# Patient Record
Sex: MALE | Race: BLACK OR AFRICAN AMERICAN | NOT HISPANIC OR LATINO | Employment: OTHER | URBAN - METROPOLITAN AREA
[De-identification: names, ages, dates, MRNs, and addresses within clinical notes are randomized per-mention and may not be internally consistent; named-entity substitution may affect disease eponyms.]

---

## 2023-12-04 ENCOUNTER — APPOINTMENT (EMERGENCY)
Dept: VASCULAR ULTRASOUND | Facility: HOSPITAL | Age: 68
End: 2023-12-04
Payer: COMMERCIAL

## 2023-12-04 ENCOUNTER — HOSPITAL ENCOUNTER (EMERGENCY)
Facility: HOSPITAL | Age: 68
Discharge: HOME | End: 2023-12-04
Attending: EMERGENCY MEDICINE
Payer: COMMERCIAL

## 2023-12-04 ENCOUNTER — APPOINTMENT (EMERGENCY)
Dept: RADIOLOGY | Facility: HOSPITAL | Age: 68
End: 2023-12-04
Payer: COMMERCIAL

## 2023-12-04 VITALS
TEMPERATURE: 97.8 F | DIASTOLIC BLOOD PRESSURE: 96 MMHG | OXYGEN SATURATION: 98 % | SYSTOLIC BLOOD PRESSURE: 156 MMHG | HEIGHT: 72 IN | RESPIRATION RATE: 16 BRPM | BODY MASS INDEX: 24.38 KG/M2 | WEIGHT: 180 LBS | HEART RATE: 74 BPM

## 2023-12-04 DIAGNOSIS — M25.562 LEFT KNEE PAIN: Primary | ICD-10-CM

## 2023-12-04 PROCEDURE — 99283 EMERGENCY DEPT VISIT LOW MDM: CPT

## 2023-12-04 PROCEDURE — 93971 EXTREMITY STUDY: CPT

## 2023-12-04 PROCEDURE — 93971 EXTREMITY STUDY: CPT | Performed by: SURGERY

## 2023-12-04 PROCEDURE — 73564 X-RAY EXAM KNEE 4 OR MORE: CPT

## 2023-12-04 NOTE — DISCHARGE INSTRUCTIONS
Follow up with PCP  Follow up with orthopedics  Warm compress, cold compress, supportive care  Return to the ED with new or worsening symptoms including but not limited to worsening pain, swelling, redness

## 2023-12-04 NOTE — ED PROVIDER NOTES
History  Chief Complaint   Patient presents with    Knee Pain     Atraumatic L knee pain x 10 days; locks up when sitting too long as per pt; +swelling as per pt      Patient is a 42-year-old male with no significant past medical history presenting to the emergency department for evaluation of posterior left knee pain. Patient states for the past 10 days he has been having pain and tightness behind the knee. States when he sits and goes to stand up he feels as though his knee locks and gets stuck and he has to stretch it out. Patient denies any redness to the area. Patient states yesterday after sitting for long period of time he thought he noticed swelling to the medial aspect of his left knee. Not taking Tylenol or Motrin for the pain, states it is not that severe. Patient denies any injury or fall. Denies fevers, chills, rash, headache, weakness, dizziness, visual changes, abdominal pain, nausea, vomiting, diarrhea, constipation, chest pain, shortness of breath or difficulty breathing. Does not offer any other concerns or complaints. None       No past medical history on file. No past surgical history on file. No family history on file. I have reviewed and agree with the history as documented. No existing history information found. No existing history information found. Review of Systems   Constitutional:  Negative for chills and fever. HENT:  Negative for ear pain and sore throat. Eyes:  Negative for pain and visual disturbance. Respiratory:  Negative for cough and shortness of breath. Cardiovascular:  Negative for chest pain and palpitations. Gastrointestinal:  Negative for abdominal pain and vomiting. Genitourinary:  Negative for dysuria and hematuria. Musculoskeletal:  Negative for arthralgias and back pain. Left posterior knee pain   Skin:  Negative for color change and rash. Neurological:  Negative for seizures and syncope.    All other systems reviewed and are negative. Physical Exam  Physical Exam  Vitals and nursing note reviewed. Constitutional:       General: He is not in acute distress. Appearance: Normal appearance. He is well-developed. He is not toxic-appearing or diaphoretic. HENT:      Head: Normocephalic and atraumatic. Right Ear: External ear normal.      Left Ear: External ear normal.      Nose: Nose normal.      Mouth/Throat:      Mouth: Mucous membranes are moist.   Eyes:      General: No scleral icterus. Right eye: No discharge. Left eye: No discharge. Conjunctiva/sclera: Conjunctivae normal.   Cardiovascular:      Rate and Rhythm: Normal rate and regular rhythm. Heart sounds: No murmur heard. Pulmonary:      Effort: Pulmonary effort is normal. No respiratory distress. Breath sounds: Normal breath sounds. Abdominal:      Palpations: Abdomen is soft. Tenderness: There is no abdominal tenderness. Musculoskeletal:         General: No swelling, deformity or signs of injury. Normal range of motion. Cervical back: Normal range of motion and neck supple. No rigidity. Legs:       Comments: No redness or swelling around the left knee or in the left calf. Patient has full range of motion. Tenderness to the posterior left knee. Skin:     General: Skin is warm and dry. Capillary Refill: Capillary refill takes less than 2 seconds. Coloration: Skin is not jaundiced. Findings: No erythema or rash. Neurological:      General: No focal deficit present. Mental Status: He is alert and oriented to person, place, and time. Mental status is at baseline. Cranial Nerves: No cranial nerve deficit. Gait: Gait normal.   Psychiatric:         Mood and Affect: Mood normal.         Behavior: Behavior normal.         Thought Content:  Thought content normal.         Judgment: Judgment normal.         Vital Signs  ED Triage Vitals [12/04/23 0808]   Temperature Pulse Respirations Blood Pressure SpO2   97.8 °F (36.6 °C) 74 16 156/96 98 %      Temp src Heart Rate Source Patient Position - Orthostatic VS BP Location FiO2 (%)   -- -- -- -- --      Pain Score       --           Vitals:    12/04/23 0808   BP: 156/96   Pulse: 74         Visual Acuity      ED Medications  Medications - No data to display    Diagnostic Studies  Results Reviewed       None                   VAS lower limb venous duplex study, unilateral/limited    (Results Pending)   XR knee 4+ views left injury    (Results Pending)              Procedures  Procedures         ED Course             SBIRT 20yo+      Flowsheet Row Most Recent Value   Initial Alcohol Screen: US AUDIT-C     1. How often do you have a drink containing alcohol? 0 Filed at: 12/04/2023 0807   2. How many drinks containing alcohol do you have on a typical day you are drinking? 0 Filed at: 12/04/2023 0807   3a. Male UNDER 65: How often do you have five or more drinks on one occasion? 0 Filed at: 12/04/2023 0807   3b. FEMALE Any Age, or MALE 65+: How often do you have 4 or more drinks on one occassion? 0 Filed at: 12/04/2023 6636   Audit-C Score 0 Filed at: 12/04/2023 8401   JEAN: How many times in the past year have you. .. Used an illegal drug or used a prescription medication for non-medical reasons? Never Filed at: 12/04/2023 2784                      Medical Decision Making    This is a 54-year-old male present to the emergency department evaluation of left knee pain. Patient states having pain to the left knee for the past 10 days. Patient denies any injury or fall. Patient states he feels the knee locks when sitting for too long. Patient reports noticing swelling to the medial aspect of his knee after sitting for long period of time last night. Patient is in no acute distress with stable vital signs on initial examination.     Differential diagnosis to include but is not limited to: bakers cyst, effusion, DVT    Initial ED Plan: venous duplex    ED results: fluid collection at the anteriolateral aspect of the left knee  -no visible area for drainage  Negative for DVT  Xray images knee independently visualized and interpreted by me - no acute fracture or dislocation. All radiology studies independently viewed by me and interpreted by the radiologist.    Final ED assessment: Patient is stable and well appearing. Discussed radiologic studies. Discussed follow up with PCP and orthopedics. Strict return precautions were discussed including but not limited to worsening pain, swelling, redness. Patient verbalized understanding and is agreeable with the plan for discharge. Amount and/or Complexity of Data Reviewed  Radiology: ordered. Disposition  Final diagnoses:   Left knee pain     Time reflects when diagnosis was documented in both MDM as applicable and the Disposition within this note       Time User Action Codes Description Comment    12/4/2023  9:34 AM Morenita Morales [M25.562] Left knee pain           ED Disposition       ED Disposition   Discharge    Condition   Stable    Date/Time   Mon Dec 4, 2023 [address] discharge to home/self care.                    Follow-up Information       Follow up With Specialties Details Why Contact Info Additional Information    your PCP  Call in 3 days For follow up      [facility] Emergency Department Emergency Medicine Go to  If symptoms worsen [address] [facility] [id number] [address] [facility] Emergency Department, [place], [place], [address] [facility] Call in 3 days For follow up [facility]  [address] [address]  [facility] [place], [address] [place] [address], [address]            Patient's Medications    No medications on file       No discharge procedures on file.     PDMP Review       None            ED Provider  Electronically Signed by             Amee Mccartney PA-C  12/04/23 4111

## 2023-12-14 ENCOUNTER — OFFICE VISIT (OUTPATIENT)
Dept: OBGYN CLINIC | Facility: CLINIC | Age: 68
End: 2023-12-14
Payer: COMMERCIAL

## 2023-12-14 ENCOUNTER — APPOINTMENT (OUTPATIENT)
Dept: RADIOLOGY | Facility: CLINIC | Age: 68
End: 2023-12-14
Payer: COMMERCIAL

## 2023-12-14 VITALS
DIASTOLIC BLOOD PRESSURE: 90 MMHG | HEART RATE: 91 BPM | BODY MASS INDEX: 25.47 KG/M2 | HEIGHT: 72 IN | SYSTOLIC BLOOD PRESSURE: 152 MMHG | WEIGHT: 188 LBS

## 2023-12-14 DIAGNOSIS — M25.462 EFFUSION OF LEFT KNEE: ICD-10-CM

## 2023-12-14 DIAGNOSIS — M25.562 LEFT KNEE PAIN, UNSPECIFIED CHRONICITY: ICD-10-CM

## 2023-12-14 DIAGNOSIS — M17.12 PRIMARY OSTEOARTHRITIS OF LEFT KNEE: ICD-10-CM

## 2023-12-14 DIAGNOSIS — M25.562 ACUTE PAIN OF LEFT KNEE: Primary | ICD-10-CM

## 2023-12-14 PROCEDURE — 73564 X-RAY EXAM KNEE 4 OR MORE: CPT

## 2023-12-14 PROCEDURE — 20610 DRAIN/INJ JOINT/BURSA W/O US: CPT | Performed by: ORTHOPAEDIC SURGERY

## 2023-12-14 PROCEDURE — 99204 OFFICE O/P NEW MOD 45 MIN: CPT | Performed by: ORTHOPAEDIC SURGERY

## 2023-12-14 RX ORDER — LIDOCAINE HYDROCHLORIDE 10 MG/ML
2 INJECTION, SOLUTION INFILTRATION; PERINEURAL
Status: COMPLETED | OUTPATIENT
Start: 2023-12-14 | End: 2023-12-14

## 2023-12-14 RX ORDER — BUPIVACAINE HYDROCHLORIDE 2.5 MG/ML
2 INJECTION, SOLUTION INFILTRATION; PERINEURAL
Status: COMPLETED | OUTPATIENT
Start: 2023-12-14 | End: 2023-12-14

## 2023-12-14 RX ORDER — TADALAFIL 20 MG/1
20 TABLET ORAL DAILY PRN
COMMUNITY
Start: 2023-03-07 | End: 2024-03-06

## 2023-12-14 RX ORDER — AMLODIPINE BESYLATE 5 MG/1
5 TABLET ORAL DAILY
COMMUNITY
Start: 2023-04-24 | End: 2024-04-23

## 2023-12-14 RX ORDER — METHYLPREDNISOLONE ACETATE 40 MG/ML
2 INJECTION, SUSPENSION INTRA-ARTICULAR; INTRALESIONAL; INTRAMUSCULAR; SOFT TISSUE
Status: COMPLETED | OUTPATIENT
Start: 2023-12-14 | End: 2023-12-14

## 2023-12-14 RX ADMIN — LIDOCAINE HYDROCHLORIDE 2 ML: 10 INJECTION, SOLUTION INFILTRATION; PERINEURAL at 08:00

## 2023-12-14 RX ADMIN — METHYLPREDNISOLONE ACETATE 2 ML: 40 INJECTION, SUSPENSION INTRA-ARTICULAR; INTRALESIONAL; INTRAMUSCULAR; SOFT TISSUE at 08:00

## 2023-12-14 RX ADMIN — BUPIVACAINE HYDROCHLORIDE 2 ML: 2.5 INJECTION, SOLUTION INFILTRATION; PERINEURAL at 08:00

## 2023-12-14 NOTE — PROGRESS NOTES
Patient Name:  Calli Medina  MRN:  [id number]    [id number] [facility]     1. Acute pain of left knee  -     XR knee 4+ vw left injury; Future; Expected date: 12/14/2023  -     Large joint arthrocentesis: L knee    2. Primary osteoarthritis of left knee  -     Large joint arthrocentesis: L knee    3. Effusion of left knee  -     Large joint arthrocentesis: L knee        Mild left knee osteoarthritis with acute pain  X-rays reviewed in office today with patient. In depth conversation had with patient regarding nonoperative treatment of Left knee pain including OTC topical and oral analgesics, outpatient PT to maintain ROM and improve strength, CSI vs visco injections. Also briefly discussed moving forward with MRI secondary to patient's relatively well maintained joint spaces on x-ray. Briefly discussed symptoms or meniscus tears and treatment. At this time, patient would like to hold off an consider CSI for pain relief. Tolerated procedure well. Advised patient CSI may be provided every 3 months for pain relief. If symptoms of locking, swelling and pain persist, will move forward with MRI of the Left knee to evaluate internal derangement and rule out meniscus tear. Can administer OTC medication as needed and as prescribed on the bottle. Follow up in 8 weeks for reevaluation, consideration of further imaging pending symptoms     Chief Complaint     Left knee pain    History of the Present Illness     Calli Medina is a 76 y.o. male with Left knee pain for the past 2 weeks without inciting event. Patient was seen in the ED 12/4/23 secondary to pain, swelling and locking of the knee. He locates most of his pain to the back of the knee, worse with increased activity. He will occasionally get a sharp pain in the front of the knee. He has not been administering any medication for pain relief. 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 Denies history of gout. Review of Systems     Review of Systems   Constitutional:  Negative for chills and fever. HENT:  Negative for ear pain and sore throat. Eyes:  Negative for pain and visual disturbance. Respiratory:  Negative for cough and shortness of breath. Cardiovascular:  Negative for chest pain and palpitations. Gastrointestinal:  Negative for abdominal pain and vomiting. Genitourinary:  Negative for dysuria and hematuria. Musculoskeletal:  Negative for arthralgias and back pain. Skin:  Negative for color change and rash. Neurological:  Negative for seizures and syncope. All other systems reviewed and are negative. Physical Exam     /90   Pulse 91   Ht 6' (1.829 m)   Wt 85.3 kg (188 lb)   BMI 25.50 kg/m²     Left Knee  Range of motion from 0 to 125 degrees with pain at terminal flexion. There is no crepitus with range of motion. There is trace effusion. There is tenderness over the posteromedial joint line. There is 4+/5 quadriceps strength and preserved tone. The patient is able to perform a straight leg raise. negative patellar grind test.  Anterior drawer tests is negative  negative Lachman Test.   Posterior drawer test is   negative   Varus stress testing reveals no pain or instability at 0 and 30 degrees   Valgus stress testing reveals no pain or instability at 0 and 30 degrees  Dimitri's testing is negative   The patient is neurovascular intact distally. Eyes:  Anicteric sclerae. Neck:  Supple. Lungs:  Normal respiratory effort. Cardiovascular:  Capillary refill is less than 2 seconds. Skin:  Intact without erythema. Neurologic:  Sensation grossly intact to light touch. Psychiatric:  Mood and affect are appropriate. Data Review     I have personally reviewed pertinent films in PACS, and my interpretation follows:    X-rays taken 12/14/23 of Left knee independently reviewed and demonstrate no acute fracture or dislocation. Mild medial compartment degenerative changes, but relatively well maintained joint spaces. History reviewed. No pertinent past medical history. History reviewed. No pertinent surgical history. No Known Allergies    Current Outpatient Medications on File Prior to Visit   Medication Sig Dispense Refill    amLODIPine (NORVASC) 5 mg tablet Take 5 mg by mouth daily      Cholecalciferol 50 MCG (2000 UT) CAPS Take 1 capsule by mouth      tadalafil (CIALIS) 20 MG tablet Take 20 mg by mouth daily as needed       No current facility-administered medications on file prior to visit. Social History     Tobacco Use    Smoking status: Unknown   Vaping Use    Vaping status: Never Used   Substance Use Topics    Alcohol use: Never    Drug use: Never       History reviewed. No pertinent family history.           Procedures Performed     Large joint arthrocentesis: L knee  Universal Protocol:  Risks and benefits: risks, benefits and alternatives were discussed  Consent given by: patient  Patient identity confirmed: verbally with patient  Procedure Details  Location: knee - L knee  Needle size: 22 G  Approach: lateral  Medications administered: 2 mL bupivacaine 0.25 %; 2 mL lidocaine 1 %; 2 mL methylPREDNISolone acetate 40 mg/mL    Aspirate amount: 15 mL  Aspirate: clear and yellow  Patient tolerance: patient tolerated the procedure well with no immediate complications  Dressing:  Sterile dressing applied

## 2024-03-26 ENCOUNTER — OFFICE VISIT (OUTPATIENT)
Dept: OBGYN CLINIC | Facility: CLINIC | Age: 69
End: 2024-03-26
Payer: COMMERCIAL

## 2024-03-26 VITALS
SYSTOLIC BLOOD PRESSURE: 131 MMHG | HEART RATE: 77 BPM | WEIGHT: 188 LBS | HEIGHT: 72 IN | BODY MASS INDEX: 25.47 KG/M2 | DIASTOLIC BLOOD PRESSURE: 76 MMHG

## 2024-03-26 DIAGNOSIS — M25.561 ACUTE PAIN OF RIGHT KNEE: ICD-10-CM

## 2024-03-26 DIAGNOSIS — M25.462 EFFUSION OF LEFT KNEE: ICD-10-CM

## 2024-03-26 DIAGNOSIS — M23.92 ACUTE INTERNAL DERANGEMENT OF LEFT KNEE: Primary | ICD-10-CM

## 2024-03-26 PROCEDURE — 99214 OFFICE O/P EST MOD 30 MIN: CPT | Performed by: ORTHOPAEDIC SURGERY

## 2024-03-26 RX ORDER — TAMSULOSIN HYDROCHLORIDE 0.4 MG/1
0.4 CAPSULE ORAL
COMMUNITY
Start: 2024-03-15 | End: 2025-03-15

## 2024-03-26 NOTE — PROGRESS NOTES
Patient Name:  Daniel Llanos  MRN:  [id number]    Assessment & Plan     1. Acute internal derangement of left knee  -     MRI knee left  wo contrast; Future; Expected date: 03/26/2024    2. Acute pain of right knee    3. Effusion of left knee      Left knee internal derangement, concern for acute meniscus tear   In light of patient's persistent knee pain and swelling despite aspiration and CSI, will move forward with MRI of Left knee to evaluate meniscus and cartilage surfaces.   In the meantime, advised patient to continue with gentle range of motion exercises to prevent stiffness. He may administer NSAIDs and apply ice as needed for swelling. Can also take Tylenol OTC.   Patient will follow up in office once MRI resulted and discussion of nonoperative vs surgical management of Left knee    History of the Present Illness   Daniel Llanos is a 68 y.o. male with Left knee swelling and pain s/p aspiration and CSI on 12/14/2023. Today, patient reports he had 100% improvement of symptoms with most recent aspiration and CSI. He started to notice increased swelling since last Thursday. He does have pain with deep knee flexion. 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         Review of Systems     Review of Systems   Constitutional:  Negative for chills and fever.   HENT:  Negative for ear pain and sore throat.    Eyes:  Negative for pain and visual disturbance.   Respiratory:  Negative for cough and shortness of breath.    Cardiovascular:  Negative for chest pain and palpitations.   Gastrointestinal:  Negative for abdominal pain and vomiting.   Genitourinary:  Negative for dysuria and hematuria.   Musculoskeletal:  Negative for arthralgias and back pain.   Skin:  Negative for color change and rash.   Neurological:  Negative for seizures and syncope.   All other systems reviewed and are negative.      Physical Exam     /76   Pulse 77   Ht 6' (1.829 m)   Wt 85.3 kg (188 lb)   BMI  25.50 kg/m²     Left Knee  Range of motion from 0 to 125 degrees with pain at terminal flexion.    There is small effusion.    There is tenderness over the medial joint line.    The patient is able to perform a straight leg raise with 5/5 quad strength.    Varus stress testing reveals no pain or instability at 0 and 30 degrees   Valgus stress testing reveals no pain or instability at 0 and 30 degrees  Positive Dimitri test, medially  The patient is neurovascular intact distally.      Data Review     I have personally reviewed pertinent films in PACS, and my interpretation follows.    X-rays taken 12/14/23 of Left knee independently reviewed and demonstrate no acute fracture or dislocation. Mild medial compartment degenerative changes, but relatively well maintained joint spaces.     Social History     Tobacco Use    Smoking status: Unknown   Vaping Use    Vaping status: Never Used   Substance Use Topics    Alcohol use: Never    Drug use: Never           Procedures  None     Bryson Alvarez,

## 2024-04-04 ENCOUNTER — HOSPITAL ENCOUNTER (OUTPATIENT)
Dept: MRI IMAGING | Facility: CLINIC | Age: 69
Discharge: HOME | End: 2024-04-04
Payer: COMMERCIAL

## 2024-04-04 DIAGNOSIS — M23.92 ACUTE INTERNAL DERANGEMENT OF LEFT KNEE: ICD-10-CM

## 2024-04-04 PROCEDURE — 73721 MRI JNT OF LWR EXTRE W/O DYE: CPT

## 2024-04-12 ENCOUNTER — OFFICE VISIT (OUTPATIENT)
Dept: OBGYN CLINIC | Facility: CLINIC | Age: 69
End: 2024-04-12
Payer: COMMERCIAL

## 2024-04-12 VITALS
HEART RATE: 73 BPM | DIASTOLIC BLOOD PRESSURE: 91 MMHG | WEIGHT: 187.4 LBS | HEIGHT: 72 IN | SYSTOLIC BLOOD PRESSURE: 155 MMHG | BODY MASS INDEX: 25.38 KG/M2

## 2024-04-12 DIAGNOSIS — M17.12 PRIMARY OSTEOARTHRITIS OF LEFT KNEE: Primary | ICD-10-CM

## 2024-04-12 DIAGNOSIS — S83.232D COMPLEX TEAR OF MEDIAL MENISCUS OF LEFT KNEE AS CURRENT INJURY, SUBSEQUENT ENCOUNTER: ICD-10-CM

## 2024-04-12 PROCEDURE — 99213 OFFICE O/P EST LOW 20 MIN: CPT | Performed by: ORTHOPAEDIC SURGERY

## 2024-04-12 NOTE — PROGRESS NOTES
Patient Name:  Daniel Llanos  MRN:  [id number]    Assessment & Plan     1. Primary osteoarthritis of left knee    2. Complex tear of medial meniscus of left knee as current injury, subsequent encounter      Left knee medial meniscus tear and patellofemoral ostoearthritis  MRI reviewed in office with patient  Overall, patient feeling improvement since initial visit without symptoms of locking or pain.   Briefly discussed nonoperative vs surgical management of Left knee.   Nonoperative management by means of repeat CSI vs visco injections, outpatient PT, OTC topical and oral analgesics.   If patient's symptoms of pain and swelling were to worsen or if he were to experience locking symptoms, would recommend moving forward with surgical intervention by means of knee arthroscopic with partial medial menisectomy. Patient verbalized understanding, but would like to continue with nonoperative management at this time.  Follow up as needed     History of the Present Illness   Daniel Llanos is a 68 y.o. male with Left knee internal derangement. Today, patient reports he is feeling much better compared to his first appointment in office. He admits to using a copper fit knee sleeve with improvement and decreased swelling. He denies pain or taking any medications for pain. He admits to feeling some discomfort in the front of his knee occasionally. He denies activities that make the pain worse. Overall, he is feeling better since last appointment.         Review of Systems     Review of Systems   Constitutional:  Negative for chills and fever.   HENT:  Negative for ear pain and sore throat.    Eyes:  Negative for pain and visual disturbance.   Respiratory:  Negative for cough and shortness of breath.    Cardiovascular:  Negative for chest pain and palpitations.   Gastrointestinal:  Negative for abdominal pain and vomiting.   Genitourinary:  Negative for dysuria and hematuria.   Musculoskeletal:  Negative for arthralgias and back  pain.   Skin:  Negative for color change and rash.   Neurological:  Negative for seizures and syncope.   All other systems reviewed and are negative.      Physical Exam     /91 Comment: pt did not take BP medications this morning  Pulse 73   Ht 6' (1.829 m)   Wt 85 kg (187 lb 6.4 oz)   BMI 25.42 kg/m²     Left Knee  Range of motion from 0 to 125 degrees without pain.    There is no effusion.    There is no tenderness over the knee.    The patient is able to perform a straight leg raise with 4+/5 quad strength.    Varus stress testing reveals no pain or instability at 0 and 30 degrees   Valgus stress testing reveals no pain or instability at 0 and 30 degrees  Negative Dimitri test  The patient is neurovascular intact distally.      Data Review     I have personally reviewed pertinent films in PACS, and my interpretation follows.    X-rays taken 12/14/23 of Left knee independently reviewed and demonstrate no acute fracture or dislocation. Mild medial compartment degenerative changes, but relatively well maintained joint spaces.      MRI performed 04/04/2024 of Left knee demonstrates complex tear of posterior horn of medial meniscus with small fragment displaced into inter condylar notch. Full thickness cartilage fissuring at median ridge and partial loss at medial facet.     Social History     Tobacco Use    Smoking status: Unknown   Vaping Use    Vaping status: Never Used   Substance Use Topics    Alcohol use: Never    Drug use: Never           Procedures  None     Liana Selby PA-C

## 2024-04-29 ENCOUNTER — OFFICE VISIT (OUTPATIENT)
Dept: OBGYN CLINIC | Facility: CLINIC | Age: 69
End: 2024-04-29
Payer: COMMERCIAL

## 2024-04-29 VITALS
BODY MASS INDEX: 25.98 KG/M2 | HEART RATE: 78 BPM | SYSTOLIC BLOOD PRESSURE: 137 MMHG | WEIGHT: 191.8 LBS | HEIGHT: 72 IN | DIASTOLIC BLOOD PRESSURE: 83 MMHG

## 2024-04-29 DIAGNOSIS — M17.12 PRIMARY OSTEOARTHRITIS OF LEFT KNEE: Primary | ICD-10-CM

## 2024-04-29 DIAGNOSIS — S83.232D COMPLEX TEAR OF MEDIAL MENISCUS OF LEFT KNEE AS CURRENT INJURY, SUBSEQUENT ENCOUNTER: ICD-10-CM

## 2024-04-29 DIAGNOSIS — M25.562 LEFT KNEE PAIN, UNSPECIFIED CHRONICITY: ICD-10-CM

## 2024-04-29 PROCEDURE — 20610 DRAIN/INJ JOINT/BURSA W/O US: CPT | Performed by: ORTHOPAEDIC SURGERY

## 2024-04-29 PROCEDURE — 99214 OFFICE O/P EST MOD 30 MIN: CPT | Performed by: ORTHOPAEDIC SURGERY

## 2024-04-29 RX ORDER — BUPIVACAINE HYDROCHLORIDE 2.5 MG/ML
2 INJECTION, SOLUTION INFILTRATION; PERINEURAL
Status: COMPLETED | OUTPATIENT
Start: 2024-04-29 | End: 2024-04-29

## 2024-04-29 RX ORDER — METHYLPREDNISOLONE ACETATE 40 MG/ML
2 INJECTION, SUSPENSION INTRA-ARTICULAR; INTRALESIONAL; INTRAMUSCULAR; SOFT TISSUE
Status: COMPLETED | OUTPATIENT
Start: 2024-04-29 | End: 2024-04-29

## 2024-04-29 RX ORDER — LIDOCAINE HYDROCHLORIDE 10 MG/ML
2 INJECTION, SOLUTION INFILTRATION; PERINEURAL
Status: COMPLETED | OUTPATIENT
Start: 2024-04-29 | End: 2024-04-29

## 2024-04-29 RX ADMIN — LIDOCAINE HYDROCHLORIDE 2 ML: 10 INJECTION, SOLUTION INFILTRATION; PERINEURAL at 11:15

## 2024-04-29 RX ADMIN — BUPIVACAINE HYDROCHLORIDE 2 ML: 2.5 INJECTION, SOLUTION INFILTRATION; PERINEURAL at 11:15

## 2024-04-29 RX ADMIN — METHYLPREDNISOLONE ACETATE 2 ML: 40 INJECTION, SUSPENSION INTRA-ARTICULAR; INTRALESIONAL; INTRAMUSCULAR; SOFT TISSUE at 11:15

## 2024-04-29 NOTE — PROGRESS NOTES
Patient Name:  Daniel Llanos  MRN:  [id number]    Assessment & Plan     1. Primary osteoarthritis of left knee  -     Large joint arthrocentesis: L knee    2. Complex tear of medial meniscus of left knee as current injury, subsequent encounter  -     Large joint arthrocentesis: L knee    3. Left knee pain, unspecified chronicity  -     Large joint arthrocentesis: L knee      Left knee osteoarthritis and medial meniscus tear  Discussed the patient's diagnosis and associated treatment options including conservative and surgical treatment.    Non operative management would include formal physical therapy and physician directed home exercise program, activity modification, oral analgesics, and possible injection therapy.    Surgical intervention was discussed by means of knee arthroscopic partial medial menisectomy.   Risks of surgery, including but not limited to, anesthesia complications, infection, damage to nerves and blood vessels, blood clots, postoperative stiffness, recurrent meniscal tearing, posttraumatic arthritis, residual pain, need for subsequent surgery, were discussed at length.    Educated patient about the procedure, intraoperative findings, outpatient PT, return to unrestricted activity.  Educated patient about variable results post operatively secondary to preexisting osteoarthritis. Advised patient some of his current symptoms are likely from osteoarthritis. Recommended repeat CSI today. Risks discussed and tolerated procedure well. Can repeat every 3 months as needed for pain relief vs consideration of visco injections.   Follow up in 3 months for reevaluation and discussion of continued nonoperative vs surgical management pending symptoms       History of the Present Illness   Daniel Llanos is a 69 y.o. male with Left knee osteoarthritis and degenerative medial meniscus tear. Today, patient reports he has been having some pain at the anterior medial aspect of the knee. 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 He continues to wear the knee sleeve. He admits to pain relief from previous CSI on 12/14/2023.         Review of Systems     Review of Systems   Constitutional:  Negative for chills and fever.   HENT:  Negative for ear pain and sore throat.    Eyes:  Negative for pain and visual disturbance.   Respiratory:  Negative for cough and shortness of breath.    Cardiovascular:  Negative for chest pain and palpitations.   Gastrointestinal:  Negative for abdominal pain and vomiting.   Genitourinary:  Negative for dysuria and hematuria.   Musculoskeletal:  Negative for arthralgias and back pain.   Skin:  Negative for color change and rash.   Neurological:  Negative for seizures and syncope.   All other systems reviewed and are negative.      Physical Exam     /83   Pulse 78   Ht 6' (1.829 m)   Wt 87 kg (191 lb 12.8 oz)   BMI 26.01 kg/m²     Left Knee  Range of motion from 0 to 120 degrees without pain.    There is no effusion.    There is tenderness over the posteromedial joint line.    The patient is able to perform a straight leg raise with 5/5 quad strength.    Varus stress testing reveals no pain or instability at 0 and 30 degrees   Valgus stress testing reveals no pain or instability at 0 and 30 degrees  Negative Dimitri test  Negative Thessaly test  The patient is neurovascular intact distally.      Data Review     I have personally reviewed pertinent films in PACS, and my interpretation follows.    X-rays taken 12/14/23 of Left knee independently reviewed and demonstrate no acute fracture or dislocation. Mild medial compartment degenerative changes, but relatively well maintained joint spaces.       MRI performed 04/04/2024 of Left knee demonstrates complex tear of posterior horn of medial meniscus with small fragment displaced into inter condylar notch. Full thickness cartilage fissuring at median ridge and partial loss at medial facet.     Social  History     Tobacco Use    Smoking status: Unknown   Vaping Use    Vaping status: Never Used   Substance Use Topics    Alcohol use: Never    Drug use: Never           Large joint arthrocentesis: L knee  Universal Protocol:  Risks and benefits: risks, benefits and alternatives were discussed  Consent given by: patient  Patient identity confirmed: verbally with patient  Procedure Details  Location: knee - L knee  Needle size: 22 G  Approach: lateral  Medications administered: 2 mL bupivacaine 0.25 %; 2 mL lidocaine 1 %; 2 mL methylPREDNISolone acetate 40 mg/mL    Patient tolerance: patient tolerated the procedure well with no immediate complications  Dressing:  Sterile dressing applied            Bryson Alvarez DO

## 2024-12-09 ENCOUNTER — APPOINTMENT (EMERGENCY)
Dept: RADIOLOGY | Facility: HOSPITAL | Age: 69
DRG: 202 | End: 2024-12-09
Payer: COMMERCIAL

## 2024-12-09 ENCOUNTER — HOSPITAL ENCOUNTER (INPATIENT)
Facility: HOSPITAL | Age: 69
LOS: 2 days | Discharge: HOME | DRG: 202 | End: 2024-12-11
Attending: EMERGENCY MEDICINE
Payer: COMMERCIAL

## 2024-12-09 DIAGNOSIS — J20.9 ACUTE BRONCHITIS: ICD-10-CM

## 2024-12-09 DIAGNOSIS — Z72.0 TOBACCO ABUSE: ICD-10-CM

## 2024-12-09 DIAGNOSIS — J20.8 ACUTE BRONCHITIS DUE TO OTHER SPECIFIED ORGANISMS: ICD-10-CM

## 2024-12-09 DIAGNOSIS — J96.01 ACUTE RESPIRATORY FAILURE WITH HYPOXIA (HCC): ICD-10-CM

## 2024-12-09 DIAGNOSIS — R09.02 HYPOXIA: Primary | ICD-10-CM

## 2024-12-09 PROBLEM — N40.0 BPH (BENIGN PROSTATIC HYPERPLASIA): Status: ACTIVE | Noted: 2024-12-09

## 2024-12-09 PROBLEM — R06.2 WHEEZING: Status: ACTIVE | Noted: 2024-12-09

## 2024-12-09 PROBLEM — I10 PRIMARY HYPERTENSION: Status: ACTIVE | Noted: 2024-12-09

## 2024-12-09 LAB
ALBUMIN SERPL BCG-MCNC: 3.9 G/DL (ref 3.5–5)
ALP SERPL-CCNC: 48 U/L (ref 34–104)
ALT SERPL W P-5'-P-CCNC: 9 U/L (ref 7–52)
ANION GAP SERPL CALCULATED.3IONS-SCNC: 7 MMOL/L (ref 4–13)
AST SERPL W P-5'-P-CCNC: 10 U/L (ref 13–39)
ATRIAL RATE: 79 BPM
BASOPHILS # BLD MANUAL: 0 THOUSAND/UL (ref 0–0.1)
BASOPHILS NFR MAR MANUAL: 0 % (ref 0–1)
BILIRUB SERPL-MCNC: 0.83 MG/DL (ref 0.2–1)
BUN SERPL-MCNC: 10 MG/DL (ref 5–25)
CALCIUM SERPL-MCNC: 9 MG/DL (ref 8.4–10.2)
CARDIAC TROPONIN I PNL SERPL HS: 4 NG/L (ref ?–50)
CHLORIDE SERPL-SCNC: 100 MMOL/L (ref 96–108)
CO2 SERPL-SCNC: 29 MMOL/L (ref 21–32)
CREAT SERPL-MCNC: 0.89 MG/DL (ref 0.6–1.3)
EOSINOPHIL # BLD MANUAL: 0.21 THOUSAND/UL (ref 0–0.4)
EOSINOPHIL NFR BLD MANUAL: 2 % (ref 0–6)
ERYTHROCYTE [DISTWIDTH] IN BLOOD BY AUTOMATED COUNT: 12.8 % (ref 11.6–15.1)
FLUAV AG UPPER RESP QL IA.RAPID: NEGATIVE
FLUBV AG UPPER RESP QL IA.RAPID: NEGATIVE
GFR SERPL CREATININE-BSD FRML MDRD: 87 ML/MIN/1.73SQ M
GLUCOSE SERPL-MCNC: 104 MG/DL (ref 65–140)
HCT VFR BLD AUTO: 39.2 % (ref 36.5–49.3)
HGB BLD-MCNC: 13.5 G/DL (ref 12–17)
LACTATE SERPL-SCNC: 1 MMOL/L (ref 0.5–2)
LYMPHOCYTES # BLD AUTO: 2.83 THOUSAND/UL (ref 0.6–4.47)
LYMPHOCYTES # BLD AUTO: 22 % (ref 14–44)
MCH RBC QN AUTO: 29.5 PG (ref 26.8–34.3)
MCHC RBC AUTO-ENTMCNC: 34.4 G/DL (ref 31.4–37.4)
MCV RBC AUTO: 86 FL (ref 82–98)
MONOCYTES # BLD AUTO: 1.15 THOUSAND/UL (ref 0–1.22)
MONOCYTES NFR BLD: 11 % (ref 4–12)
NEUTROPHILS # BLD MANUAL: 6.28 THOUSAND/UL (ref 1.85–7.62)
NEUTS BAND NFR BLD MANUAL: 2 % (ref 0–8)
NEUTS SEG NFR BLD AUTO: 58 % (ref 43–75)
P AXIS: 61 DEGREES
PLATELET # BLD AUTO: 195 THOUSANDS/UL (ref 149–390)
PLATELET BLD QL SMEAR: ADEQUATE
PMV BLD AUTO: 10.1 FL (ref 8.9–12.7)
POTASSIUM SERPL-SCNC: 4.2 MMOL/L (ref 3.5–5.3)
PR INTERVAL: 134 MS
PROCALCITONIN SERPL-MCNC: <0.05 NG/ML
PROT SERPL-MCNC: 6.9 G/DL (ref 6.4–8.4)
QRS AXIS: -23 DEGREES
QRSD INTERVAL: 68 MS
QT INTERVAL: 388 MS
QTC INTERVAL: 444 MS
RBC # BLD AUTO: 4.57 MILLION/UL (ref 3.88–5.62)
RBC MORPH BLD: NORMAL
S PYO DNA THROAT QL NAA+PROBE: NOT DETECTED
SARS-COV+SARS-COV-2 AG RESP QL IA.RAPID: NEGATIVE
SODIUM SERPL-SCNC: 136 MMOL/L (ref 135–147)
T WAVE AXIS: 76 DEGREES
VARIANT LYMPHS # BLD AUTO: 5 %
VENTRICULAR RATE: 79 BPM
WBC # BLD AUTO: 10.47 THOUSAND/UL (ref 4.31–10.16)

## 2024-12-09 PROCEDURE — 96360 HYDRATION IV INFUSION INIT: CPT

## 2024-12-09 PROCEDURE — 85027 COMPLETE CBC AUTOMATED: CPT | Performed by: EMERGENCY MEDICINE

## 2024-12-09 PROCEDURE — 71046 X-RAY EXAM CHEST 2 VIEWS: CPT

## 2024-12-09 PROCEDURE — 94664 DEMO&/EVAL PT USE INHALER: CPT

## 2024-12-09 PROCEDURE — 99223 1ST HOSP IP/OBS HIGH 75: CPT

## 2024-12-09 PROCEDURE — 93005 ELECTROCARDIOGRAM TRACING: CPT

## 2024-12-09 PROCEDURE — 83605 ASSAY OF LACTIC ACID: CPT | Performed by: EMERGENCY MEDICINE

## 2024-12-09 PROCEDURE — 94640 AIRWAY INHALATION TREATMENT: CPT

## 2024-12-09 PROCEDURE — 99285 EMERGENCY DEPT VISIT HI MDM: CPT | Performed by: EMERGENCY MEDICINE

## 2024-12-09 PROCEDURE — 87811 SARS-COV-2 COVID19 W/OPTIC: CPT | Performed by: EMERGENCY MEDICINE

## 2024-12-09 PROCEDURE — 87804 INFLUENZA ASSAY W/OPTIC: CPT | Performed by: EMERGENCY MEDICINE

## 2024-12-09 PROCEDURE — 99284 EMERGENCY DEPT VISIT MOD MDM: CPT

## 2024-12-09 PROCEDURE — 96361 HYDRATE IV INFUSION ADD-ON: CPT

## 2024-12-09 PROCEDURE — 94760 N-INVAS EAR/PLS OXIMETRY 1: CPT

## 2024-12-09 PROCEDURE — 85007 BL SMEAR W/DIFF WBC COUNT: CPT | Performed by: EMERGENCY MEDICINE

## 2024-12-09 PROCEDURE — 84145 PROCALCITONIN (PCT): CPT | Performed by: EMERGENCY MEDICINE

## 2024-12-09 PROCEDURE — 93010 ELECTROCARDIOGRAM REPORT: CPT | Performed by: INTERNAL MEDICINE

## 2024-12-09 PROCEDURE — 36415 COLL VENOUS BLD VENIPUNCTURE: CPT | Performed by: EMERGENCY MEDICINE

## 2024-12-09 PROCEDURE — 84484 ASSAY OF TROPONIN QUANT: CPT | Performed by: EMERGENCY MEDICINE

## 2024-12-09 PROCEDURE — 80053 COMPREHEN METABOLIC PANEL: CPT | Performed by: EMERGENCY MEDICINE

## 2024-12-09 PROCEDURE — 87040 BLOOD CULTURE FOR BACTERIA: CPT | Performed by: EMERGENCY MEDICINE

## 2024-12-09 PROCEDURE — 87651 STREP A DNA AMP PROBE: CPT | Performed by: EMERGENCY MEDICINE

## 2024-12-09 RX ORDER — ACETAMINOPHEN 325 MG/1
975 TABLET ORAL EVERY 6 HOURS PRN
Status: DISCONTINUED | OUTPATIENT
Start: 2024-12-09 | End: 2024-12-11 | Stop reason: HOSPADM

## 2024-12-09 RX ORDER — GABAPENTIN 300 MG/1
300 CAPSULE ORAL 3 TIMES DAILY
COMMUNITY

## 2024-12-09 RX ORDER — ALBUTEROL SULFATE 0.83 MG/ML
5 SOLUTION RESPIRATORY (INHALATION) ONCE
Status: COMPLETED | OUTPATIENT
Start: 2024-12-09 | End: 2024-12-09

## 2024-12-09 RX ORDER — ALBUTEROL SULFATE 90 UG/1
2 INHALANT RESPIRATORY (INHALATION) EVERY 6 HOURS PRN
Status: DISCONTINUED | OUTPATIENT
Start: 2024-12-09 | End: 2024-12-11 | Stop reason: HOSPADM

## 2024-12-09 RX ORDER — LEVALBUTEROL INHALATION SOLUTION 1.25 MG/3ML
1.25 SOLUTION RESPIRATORY (INHALATION)
Status: DISCONTINUED | OUTPATIENT
Start: 2024-12-09 | End: 2024-12-11 | Stop reason: HOSPADM

## 2024-12-09 RX ORDER — GABAPENTIN 300 MG/1
300 CAPSULE ORAL 3 TIMES DAILY
Status: DISCONTINUED | OUTPATIENT
Start: 2024-12-09 | End: 2024-12-11 | Stop reason: HOSPADM

## 2024-12-09 RX ORDER — SODIUM CHLORIDE 9 MG/ML
3 INJECTION INTRAVENOUS
Status: DISCONTINUED | OUTPATIENT
Start: 2024-12-09 | End: 2024-12-11 | Stop reason: HOSPADM

## 2024-12-09 RX ORDER — AMLODIPINE BESYLATE 10 MG/1
10 TABLET ORAL DAILY
Status: DISCONTINUED | OUTPATIENT
Start: 2024-12-09 | End: 2024-12-11 | Stop reason: HOSPADM

## 2024-12-09 RX ORDER — METHYLPREDNISOLONE SODIUM SUCCINATE 40 MG/ML
40 INJECTION, POWDER, LYOPHILIZED, FOR SOLUTION INTRAMUSCULAR; INTRAVENOUS EVERY 12 HOURS SCHEDULED
Status: DISCONTINUED | OUTPATIENT
Start: 2024-12-09 | End: 2024-12-10

## 2024-12-09 RX ORDER — NICOTINE 21 MG/24HR
14 PATCH, TRANSDERMAL 24 HOURS TRANSDERMAL ONCE
Status: COMPLETED | OUTPATIENT
Start: 2024-12-09 | End: 2024-12-10

## 2024-12-09 RX ORDER — IPRATROPIUM BROMIDE AND ALBUTEROL SULFATE 2.5; .5 MG/3ML; MG/3ML
3 SOLUTION RESPIRATORY (INHALATION)
Status: DISCONTINUED | OUTPATIENT
Start: 2024-12-09 | End: 2024-12-09

## 2024-12-09 RX ORDER — PREDNISONE 20 MG/1
60 TABLET ORAL ONCE
Status: COMPLETED | OUTPATIENT
Start: 2024-12-09 | End: 2024-12-09

## 2024-12-09 RX ORDER — AZITHROMYCIN 500 MG/1
500 TABLET, FILM COATED ORAL EVERY 24 HOURS
Status: COMPLETED | OUTPATIENT
Start: 2024-12-09 | End: 2024-12-11

## 2024-12-09 RX ORDER — ENOXAPARIN SODIUM 100 MG/ML
40 INJECTION SUBCUTANEOUS DAILY
Status: DISCONTINUED | OUTPATIENT
Start: 2024-12-09 | End: 2024-12-11 | Stop reason: HOSPADM

## 2024-12-09 RX ORDER — TAMSULOSIN HYDROCHLORIDE 0.4 MG/1
0.4 CAPSULE ORAL
Status: DISCONTINUED | OUTPATIENT
Start: 2024-12-09 | End: 2024-12-11 | Stop reason: HOSPADM

## 2024-12-09 RX ADMIN — LEVALBUTEROL HYDROCHLORIDE 1.25 MG: 1.25 SOLUTION RESPIRATORY (INHALATION) at 19:17

## 2024-12-09 RX ADMIN — IPRATROPIUM BROMIDE 0.5 MG: 0.5 SOLUTION RESPIRATORY (INHALATION) at 19:17

## 2024-12-09 RX ADMIN — ALBUTEROL SULFATE 5 MG: 2.5 SOLUTION RESPIRATORY (INHALATION) at 10:19

## 2024-12-09 RX ADMIN — IPRATROPIUM BROMIDE 0.5 MG: 0.5 SOLUTION RESPIRATORY (INHALATION) at 10:19

## 2024-12-09 RX ADMIN — TAMSULOSIN HYDROCHLORIDE 0.4 MG: 0.4 CAPSULE ORAL at 16:39

## 2024-12-09 RX ADMIN — ENOXAPARIN SODIUM 40 MG: 40 INJECTION SUBCUTANEOUS at 13:41

## 2024-12-09 RX ADMIN — AMLODIPINE BESYLATE 10 MG: 10 TABLET ORAL at 13:41

## 2024-12-09 RX ADMIN — NICOTINE 14 MG: 14 PATCH, EXTENDED RELEASE TRANSDERMAL at 17:03

## 2024-12-09 RX ADMIN — GABAPENTIN 300 MG: 300 CAPSULE ORAL at 21:21

## 2024-12-09 RX ADMIN — AZITHROMYCIN 500 MG: 500 TABLET, FILM COATED ORAL at 13:41

## 2024-12-09 RX ADMIN — IPRATROPIUM BROMIDE AND ALBUTEROL SULFATE 3 ML: 2.5; .5 SOLUTION RESPIRATORY (INHALATION) at 14:01

## 2024-12-09 RX ADMIN — PREDNISONE 60 MG: 20 TABLET ORAL at 10:19

## 2024-12-09 RX ADMIN — SODIUM CHLORIDE 1000 ML: 0.9 INJECTION, SOLUTION INTRAVENOUS at 10:24

## 2024-12-09 RX ADMIN — IPRATROPIUM BROMIDE 0.5 MG: 0.5 SOLUTION RESPIRATORY (INHALATION) at 12:39

## 2024-12-09 RX ADMIN — GABAPENTIN 300 MG: 300 CAPSULE ORAL at 16:39

## 2024-12-09 RX ADMIN — ALBUTEROL SULFATE 5 MG: 2.5 SOLUTION RESPIRATORY (INHALATION) at 12:39

## 2024-12-09 RX ADMIN — METHYLPREDNISOLONE SODIUM SUCCINATE 40 MG: 40 INJECTION, POWDER, FOR SOLUTION INTRAMUSCULAR; INTRAVENOUS at 18:36

## 2024-12-09 RX ADMIN — CEFTRIAXONE SODIUM 2000 MG: 10 INJECTION, POWDER, FOR SOLUTION INTRAVENOUS at 12:47

## 2024-12-09 NOTE — H&P
H&P - Hospitalist   Name: Daniel Llanos 69 y.o. male I MRN: [id number]  Unit/Bed#: -01 I Date of Admission: 12/9/2024   Date of Service: 12/9/2024 I Hospital Day: 0     Assessment & Plan  Wheezing  Patient on presentation due to acute wheezing cough and congestion starting  Patient reports being a current smoker.  Was never diagnosed for COPD in the past.  Received IV steroids and treatment in the ED however patient was noted to have O2 drop in 80s on room air while ambulating.  Admitted for possible COPD exacerbation vs acute bronchitis.  Chest x-ray unremarkable for pneumonia.  COVID/RSV/flu normal  Plan:  Will admit for treatment with nebulizer and steroids and monitor O2 sats.  Will start on azithromycin 500 mg for 3 days to cover for bronchitis and also for anti-inflammatory effect.  Pending procalcitonin  Patient will require PCP follow up and possible PFTs in the outpatient setting     Acute bronchitis due to other specified organisms  Patient on presentation due to cough, sore throat, congestion and wheezing.  Chest x-ray unrevealing infiltrates concerning for pneumonia.  Questionable COPD exacerbation given patient current smoker.  Started azithromycin 500 mg for 3 days,  Started IV steroids and nebulizers  Tobacco abuse  Admits to be a current smoker.  Counseled about smoking cessation    Primary hypertension  Reports taking amlodipine 10 mg daily  Will continue home regimen  BPH (benign prostatic hyperplasia)  Tamsulosin 0.4 mg  Continue home regimen      VTE Pharmacologic Prophylaxis: VTE Score: 5 High Risk (Score >/= 5) - Pharmacological DVT Prophylaxis Ordered: enoxaparin (Lovenox). Sequential Compression Devices Ordered.  Code Status: Level 1 - Full Code patient  Discussion with family: Updated  (wife) at bedside.    Anticipated Length of Stay: Patient will be admitted on an inpatient basis with an anticipated length of stay of greater than 2 midnights secondary to cough, wheezing,  hypoxia in the ED.    History of Present Illness   Chief Complaint: Cough, sore throat, wheezingsore throat, wheezing wheezing    Daniel Llanos is a 69 y.o. male with a PMH of tobacco abuse, hypertension, BPH who presents with upper respiratory infection such as cough, sore throat, chills, congestion.  Symptoms started on Thursday.  Patient reports that his son also got sick.  In the ED patient was noted to have diffuse wheezing and rhonchi.  Was given oral steroids and nebulizers.  Patient was ambulated to assess the oxygen level and was noted to drop into the 80s.  Patient was ultimately decided to be admitted for bronchitis/COPD exacerbation management and O2 monitoring.    Review of Systems   Constitutional:  Negative for chills and fever.   HENT:  Positive for sore throat. Negative for ear pain.    Eyes:  Negative for pain and visual disturbance.   Respiratory:  Positive for cough and wheezing. Negative for shortness of breath.    Cardiovascular:  Negative for chest pain and palpitations.   Gastrointestinal:  Negative for abdominal pain and vomiting.   Genitourinary:  Negative for dysuria and hematuria.   Musculoskeletal:  Negative for arthralgias and back pain.   Skin:  Negative for color change and rash.   Neurological:  Positive for light-headedness. Negative for seizures and syncope.   All other systems reviewed and are negative.      Historical Information   History reviewed. No pertinent past medical history.  History reviewed. No pertinent surgical history.  Social History     Tobacco Use    Smoking status: Unknown    Smokeless tobacco: Not on file   Vaping Use    Vaping status: Never Used   Substance and Sexual Activity    Alcohol use: Never    Drug use: Never    Sexual activity: Not on file     E-Cigarette/Vaping    E-Cigarette Use Never User      E-Cigarette/Vaping Substances    Nicotine No     THC No     CBD No     Flavoring No     Other No     Unknown No      History reviewed. No pertinent family  history.  Social History:  Marital Status: /Civil Union   Occupation:   Patient Pre-hospital Living Situation: Home  Patient Pre-hospital Level of Mobility: walks  Patient Pre-hospital Diet Restrictions: no    Meds/Allergies   I have reviewed home medications with patient personally.  Prior to Admission medications    Medication Sig Start Date End Date Taking? Authorizing Provider   amLODIPine (NORVASC) 5 mg tablet Take 10 mg by mouth daily 4/24/23 12/9/24 Yes Historical Provider, MD   gabapentin (NEURONTIN) 300 mg capsule Take 300 mg by mouth 3 (three) times a day   Yes Historical Provider, MD   tamsulosin (FLOMAX) 0.4 mg Take 0.4 mg by mouth 3/15/24 3/15/25 Yes Historical Provider, MD     No Known Allergies    Objective :  Temp:  [97.7 °F (36.5 °C)] 97.7 °F (36.5 °C)  HR:  [] 89  BP: (124-139)/(70-78) 138/78  Resp:  [18-27] 27  SpO2:  [90 %-94 %] 94 %  O2 Device: None (Room air)    Physical Exam  Vitals and nursing note reviewed.   Constitutional:       General: He is not in acute distress.     Appearance: He is well-developed. He is not ill-appearing.   HENT:      Head: Normocephalic and atraumatic.   Eyes:      Conjunctiva/sclera: Conjunctivae normal.   Cardiovascular:      Rate and Rhythm: Normal rate and regular rhythm.      Heart sounds: No murmur heard.  Pulmonary:      Effort: Pulmonary effort is normal. No respiratory distress.      Breath sounds: Wheezing and rhonchi present. No rales.   Abdominal:      Palpations: Abdomen is soft.      Tenderness: There is no abdominal tenderness. There is no right CVA tenderness or left CVA tenderness.   Musculoskeletal:         General: No swelling.      Cervical back: Neck supple.      Right lower leg: No edema.      Left lower leg: No edema.   Skin:     General: Skin is warm and dry.      Capillary Refill: Capillary refill takes less than 2 seconds.   Neurological:      Mental Status: He is alert.   Psychiatric:         Mood and Affect: Mood normal.           Lines/Drains:            Lab Results: I have reviewed the following results:  Results from last 7 days   Lab Units 12/09/24  1023   WBC Thousand/uL 10.47*   HEMOGLOBIN g/dL 13.5   HEMATOCRIT % 39.2   PLATELETS Thousands/uL 195   BANDS PCT % 2   LYMPHO PCT % 22   MONO PCT % 11   EOS PCT % 2     Results from last 7 days   Lab Units 12/09/24  1023   SODIUM mmol/L 136   POTASSIUM mmol/L 4.2   CHLORIDE mmol/L 100   CO2 mmol/L 29   BUN mg/dL 10   CREATININE mg/dL 0.89   ANION GAP mmol/L 7   CALCIUM mg/dL 9.0   ALBUMIN g/dL 3.9   TOTAL BILIRUBIN mg/dL 0.83   ALK PHOS U/L 48   ALT U/L 9   AST U/L 10*   GLUCOSE RANDOM mg/dL 104             Lab Results   Component Value Date    HGBA1C 5.3 08/09/2024    HGBA1C 5.3 07/11/2018     Results from last 7 days   Lab Units 12/09/24  1236   LACTIC ACID mmol/L 1.0       Imaging Results Review: I reviewed radiology reports from this admission including: xray(s).  Other Study Results Review: EKG was reviewed.  EKG was personally reviewed and my interpretation is: NSR. HR 70s..    Administrative Statements   I have spent a total time of 55 minutes in caring for this patient on the day of the visit/encounter including Diagnostic results, Prognosis, Risks and benefits of tx options, Instructions for management, Patient and family education, Importance of tx compliance, Risk factor reductions, Documenting in the medical record, Reviewing / ordering tests, medicine, procedures  , and Obtaining or reviewing history  .    ** Please Note: This note has been constructed using a voice recognition system. **

## 2024-12-09 NOTE — ED PROVIDER NOTES
Time reflects when diagnosis was documented in both MDM as applicable and the Disposition within this note       Time User Action Codes Description Comment    12/9/2024 12:32 PM Lupe Murphy Add [R09.02] Hypoxia     12/9/2024 12:32 PM Lupe Murphy Add [J44.1] COPD exacerbation (HCC)     12/9/2024 12:33 PM Lupe Murphy Remove [J44.1] COPD exacerbation (HCC)     12/9/2024 12:33 PM Lupe Murphy Add [J20.9] Acute bronchitis           ED Disposition       ED Disposition   Admit    Condition   Stable    Date/Time   Mon Dec 9, 2024 12:32 PM    Comment   Case was discussed with ALOK and the patient's admission status was agreed to be Admission Status: inpatient status to the service of Dr. Viera .               Assessment & Plan       Medical Decision Making  68 y/o male presents to the ED for flu like symptoms and wheezing- will get labs, covid/ flu, and cxr. Will give steroids, breathing tx and reassess    Patient with amb pulse ox at 80% RA. He improved at rest. Will admit.     Amount and/or Complexity of Data Reviewed  Labs: ordered. Decision-making details documented in ED Course.  Radiology: ordered.    Risk  Prescription drug management.  Decision regarding hospitalization.        ED Course as of 12/09/24 1358   Mon Dec 09, 2024   1123 STREP A PCR: Not Detected   1230 Patient's ambulatory pulse ox decreased to 80% on RA, when sitting it went back up to 93%. Will give another breathing tx, abx, and admit        Medications   sodium chloride (PF) 0.9 % injection 3 mL (has no administration in time range)   amLODIPine (NORVASC) tablet 10 mg (has no administration in time range)   gabapentin (NEURONTIN) capsule 300 mg (has no administration in time range)   tamsulosin (FLOMAX) capsule 0.4 mg (has no administration in time range)   enoxaparin (LOVENOX) subcutaneous injection 40 mg (has no administration in time range)   methylPREDNISolone sodium succinate (Solu-MEDROL) injection 40 mg (has no  administration in time range)   ipratropium-albuterol (DUO-NEB) 0.5-2.5 mg/3 mL inhalation solution 3 mL (has no administration in time range)   azithromycin (ZITHROMAX) tablet 500 mg (has no administration in time range)   sodium chloride 0.9 % bolus 1,000 mL (0 mL Intravenous Stopped 12/9/24 1224)   albuterol inhalation solution 5 mg (5 mg Nebulization Given 12/9/24 1019)   ipratropium (ATROVENT) 0.02 % inhalation solution 0.5 mg (0.5 mg Nebulization Given 12/9/24 1019)   predniSONE tablet 60 mg (60 mg Oral Given 12/9/24 1019)   ceftriaxone (ROCEPHIN) 2 g/50 mL in dextrose IVPB (2,000 mg Intravenous New Bag 12/9/24 1247)   albuterol inhalation solution 5 mg (5 mg Nebulization Given 12/9/24 1239)   ipratropium (ATROVENT) 0.02 % inhalation solution 0.5 mg (0.5 mg Nebulization Given 12/9/24 1239)       ED Risk Strat Scores                   Identification of Seniors at Risk      Flowsheet Row Most Recent Value   (ISAR) Identification of Seniors at Risk    Before the illness or injury that brought you to the Emergency, did you need someone to help you on a regular basis? 0 Filed at: 12/09/2024 0851   In the last 24 hours, have you needed more help than usual? 0 Filed at: 12/09/2024 0851   Have you been hospitalized for one or more nights during the past 6 months? 0 Filed at: 12/09/2024 0851   In general, do you see well? 0 Filed at: 12/09/2024 0851   In general, do you have serious problems with your memory? 0 Filed at: 12/09/2024 0851   Do you take more than three different medications every day? 1 Filed at: 12/09/2024 0851   ISAR Score 1 Filed at: 12/09/2024 0851                SBIRT 20yo+      Flowsheet Row Most Recent Value   Initial Alcohol Screen: US AUDIT-C     1. How often do you have a drink containing alcohol? 0 Filed at: 12/09/2024 0851   2. How many drinks containing alcohol do you have on a typical day you are drinking?  0 Filed at: 12/09/2024 0851   3b. FEMALE Any Age, or MALE 65+: How often do you have  4 or more drinks on one occassion? 0 Filed at: 12/09/2024 0851   Audit-C Score 0 Filed at: 12/09/2024 0851   JEAN: How many times in the past year have you...    Used an illegal drug or used a prescription medication for non-medical reasons? Never Filed at: 12/09/2024 0851                            History of Present Illness       Chief Complaint   Patient presents with    Cough     Cough, congestion, sore throat, and chills since last Thursday. Sick contact with child.        History reviewed. No pertinent past medical history.   History reviewed. No pertinent surgical history.   History reviewed. No pertinent family history.   Social History     Tobacco Use    Smoking status: Unknown   Vaping Use    Vaping status: Never Used   Substance Use Topics    Alcohol use: Never    Drug use: Never      E-Cigarette/Vaping    E-Cigarette Use Never User       E-Cigarette/Vaping Substances    Nicotine No     THC No     CBD No     Flavoring No     Other No     Unknown No       I have reviewed and agree with the history as documented.     68 y/o male presents to the ED for flu like symptoms x 5 days. Staets that he has had cough, congestion, sore throat, and wheezing. Son has similar symptoms. He reports hx of similar when he had covid. No known lung issues but patient is a smoker. He denies any fever, cp, abd pain, n/v, or d/c. Has not tried anything for the symptoms. No other complaints.       History provided by:  Patient      Review of Systems   Constitutional:  Negative for chills and fever.   HENT:  Positive for congestion and sore throat. Negative for ear pain.    Eyes:  Negative for pain and visual disturbance.   Respiratory:  Positive for cough and wheezing. Negative for shortness of breath.    Cardiovascular:  Negative for chest pain and leg swelling.   Gastrointestinal:  Negative for abdominal pain, diarrhea, nausea and vomiting.   Genitourinary:  Negative for dysuria, frequency, hematuria and urgency.    Musculoskeletal:  Negative for neck pain and neck stiffness.   Skin:  Negative for rash and wound.   Neurological:  Negative for weakness, numbness and headaches.   Psychiatric/Behavioral:  Negative for agitation and confusion.    All other systems reviewed and are negative.          Objective       ED Triage Vitals [12/09/24 0849]   Temperature Pulse Blood Pressure Respirations SpO2 Patient Position - Orthostatic VS   97.7 °F (36.5 °C) 101 124/70 18 93 % Sitting      Temp Source Heart Rate Source BP Location FiO2 (%) Pain Score    Oral Monitor Left arm -- --      Vitals      Date and Time Temp Pulse SpO2 Resp BP Pain Score FACES Pain Rating User   12/09/24 1343 -- 109 94 % -- 125/78 -- -- DII   12/09/24 1341 -- -- -- -- 125/78 -- -- JLW   12/09/24 1215 -- 89 94 % 27 -- -- -- SL   12/09/24 1200 -- 82 90 % 22 138/78 -- -- SL   12/09/24 1130 -- 89 93 % 21 139/76 -- -- DO   12/09/24 0849 97.7 °F (36.5 °C) 101 93 % 18 124/70 -- -- [facility]            Physical Exam  Vitals and nursing note reviewed.   Constitutional:       Appearance: He is well-developed.   HENT:      Head: Normocephalic and atraumatic.   Eyes:      Pupils: Pupils are equal, round, and reactive to light.   Cardiovascular:      Rate and Rhythm: Normal rate and regular rhythm.   Pulmonary:      Effort: Pulmonary effort is normal.      Breath sounds: Wheezing present.   Abdominal:      General: Bowel sounds are normal.      Palpations: Abdomen is soft.      Tenderness: There is no abdominal tenderness.   Musculoskeletal:         General: Normal range of motion.      Cervical back: Normal range of motion and neck supple.   Skin:     General: Skin is warm and dry.   Neurological:      General: No focal deficit present.      Mental Status: He is alert and oriented to person, place, and time.      Comments: No focal deficits         Results Reviewed       Procedure Component Value Units Date/Time    Procalcitonin [[id number]]  (Normal) Collected: 12/09/24 1236     Lab Status: Final result Specimen: Blood from Arm, Left Updated: 12/09/24 1344     Procalcitonin <0.05 ng/ml     Lactic acid, plasma (w/reflex if result > 2.0) [[id number]]  (Normal) Collected: 12/09/24 1236    Lab Status: Final result Specimen: Blood from Arm, Right Updated: 12/09/24 1301     LACTIC ACID 1.0 mmol/L     Narrative:      Result may be elevated if tourniquet was used during collection.    Blood culture #1 [[id number]] Collected: 12/09/24 1239    Lab Status: In process Specimen: Blood from Arm, Right Updated: 12/09/24 1245    Blood culture #2 [[id number]] Collected: 12/09/24 1236    Lab Status: In process Specimen: Blood from Arm, Left Updated: 12/09/24 1245    Manual Differential(PHLEBS Do Not Order) [[id number]]  (Abnormal) Collected: 12/09/24 1023    Lab Status: Final result Specimen: Blood from Arm, Right Updated: 12/09/24 1103     Segmented % 58 %      Bands % 2 %      Lymphocytes % 22 %      Monocytes % 11 %      Eosinophils % 2 %      Basophils % 0 %      Atypical Lymphocytes % 5 %      Absolute Neutrophils 6.28 Thousand/uL      Absolute Lymphocytes 2.83 Thousand/uL      Absolute Monocytes 1.15 Thousand/uL      Absolute Eosinophils 0.21 Thousand/uL      Absolute Basophils 0.00 Thousand/uL      Total Counted --     RBC Morphology Normal     Platelet Estimate Adequate    HS Troponin 0hr (reflex protocol) [[id number]]  (Normal) Collected: 12/09/24 1023    Lab Status: Final result Specimen: Blood from Arm, Right Updated: 12/09/24 1056     hs TnI 0hr 4 ng/L     Strep A PCR [[id number]]  (Normal) Collected: 12/09/24 1023    Lab Status: Final result Specimen: Throat Updated: 12/09/24 1054     STREP A PCR Not Detected    Comprehensive metabolic panel [[id number]]  (Abnormal) Collected: 12/09/24 1023    Lab Status: Final result Specimen: Blood from Arm, Right Updated: 12/09/24 1048     Sodium 136 mmol/L      Potassium 4.2 mmol/L      Chloride 100 mmol/L      CO2 29 mmol/L      ANION GAP 7 mmol/L      BUN 10  mg/dL      Creatinine 0.89 mg/dL      Glucose 104 mg/dL      Calcium 9.0 mg/dL      AST 10 U/L      ALT 9 U/L      Alkaline Phosphatase 48 U/L      Total Protein 6.9 g/dL      Albumin 3.9 g/dL      Total Bilirubin 0.83 mg/dL      eGFR 87 ml/min/1.73sq m     Narrative:      National Kid[employer] Disease Foundation guidelines for Chronic Kidney Disease (CKD):     Stage 1 with normal or high GFR (GFR > 90 mL/min/1.73 square meters)    Stage 2 Mild CKD (GFR = 60-89 mL/min/1.73 square meters)    Stage 3A Moderate CKD (GFR = 45-59 mL/min/1.73 square meters)    Stage 3B Moderate CKD (GFR = 30-44 mL/min/1.73 square meters)    Stage 4 Severe CKD (GFR = 15-29 mL/min/1.73 square meters)    Stage 5 End Stage CKD (GFR <15 mL/min/1.73 square meters)  Note: GFR calculation is accurate only with a steady state creatinine    CBC and differential [[id number]]  (Abnormal) Collected: 12/09/24 1023    Lab Status: Final result Specimen: Blood from Arm, Right Updated: 12/09/24 1033     WBC 10.47 Thousand/uL      RBC 4.57 Million/uL      Hemoglobin 13.5 g/dL      Hematocrit 39.2 %      MCV 86 fL      MCH 29.5 pg      MCHC 34.4 g/dL      RDW 12.8 %      MPV 10.1 fL      Platelets 195 Thousands/uL     Narrative:      This is an appended report.  These results have been appended to a previously verified report.    FLU/COVID Rapid Antigen (30 min. TAT) - Preferred screening test in ED [[id number]]  (Normal) Collected: 12/09/24 0852    Lab Status: Final result Specimen: Nares from Nose Updated: 12/09/24 0913     SARS COV Rapid Antigen Negative     Influenza A Rapid Antigen Negative     Influenza B Rapid Antigen Negative    Narrative:      This test has been performed using the [employer] Meri 2 FLU+SARS Antigen test under the Emergency Use Authorization (EUA). This test has been validated by the  and verified by the performing laboratory. The Meri uses lateral flow immunofluorescent sandwich assay to detect SARS-COV, Influenza A and Influenza  B Antigen.     The [employer] Meri 2 SARS Antigen test does not differentiate between SARS-CoV and SARS-CoV-2.     Negative results are presumptive and may be confirmed with a molecular assay, if necessary, for patient management. Negative results do not rule out SARS-CoV-2 or influenza infection and should not be used as the sole basis for treatment or patient management decisions. A negative test result may occur if the level of antigen in a sample is below the limit of detection of this test.     Positive results are indicative of the presence of viral antigens, but do not rule out bacterial infection or co-infection with other viruses.     All test results should be used as an adjunct to clinical observations and other information available to the provider.    FOR PEDIATRIC PATIENTS - copy/paste COVID Guidelines URL to browser: [email or web address]            X-ray chest 2 views    (Results Pending)       Procedures    ED Medication and Procedure Management   Prior to Admission Medications   Prescriptions Last Dose Informant Patient Reported? Taking?   amLODIPine (NORVASC) 5 mg tablet 12/8/2024 Self Yes Yes   Sig: Take 10 mg by mouth daily   gabapentin (NEURONTIN) 300 mg capsule 12/8/2024 Self Yes Yes   Sig: Take 300 mg by mouth 3 (three) times a day   tamsulosin (FLOMAX) 0.4 mg 12/8/2024 Self Yes Yes   Sig: Take 0.4 mg by mouth      Facility-Administered Medications: None     Current Discharge Medication List        CONTINUE these medications which have NOT CHANGED    Details   amLODIPine (NORVASC) 5 mg tablet Take 10 mg by mouth daily      gabapentin (NEURONTIN) 300 mg capsule Take 300 mg by mouth 3 (three) times a day      tamsulosin (FLOMAX) 0.4 mg Take 0.4 mg by mouth           No discharge procedures on file.  ED SEPSIS DOCUMENTATION   Time reflects when diagnosis was documented in both MDM as applicable and the Disposition within this note       Time User  Action Codes Description Comment    12/9/2024 12:32 PM Lupe Murphy Add [R09.02] Hypoxia     12/9/2024 12:32 PM Lupe Murphy Add [J44.1] COPD exacerbation (HCC)     12/9/2024 12:33 PM Lupe Murphy Remove [J44.1] COPD exacerbation (HCC)     12/9/2024 12:33 PM Lupe Murphy Add [J20.9] Acute bronchitis                  Lupe Murphy DO  12/09/24 1358

## 2024-12-09 NOTE — RESPIRATORY THERAPY NOTE
RT Protocol Note  Daniel Llanos 69 y.o. male MRN: [id number]  Unit/Bed#: -01 Encounter: [id number]    Assessment    Principal Problem:    Wheezing  Active Problems:    Tobacco abuse    Primary hypertension    BPH (benign prostatic hyperplasia)    Acute bronchitis due to other specified organisms    Physical Exam:   Assessment Type: Assess only  General Appearance: Alert, Awake  Respiratory Pattern: Normal  Chest Assessment: Chest expansion symmetrical  Bilateral Breath Sounds: Diminished, Expiratory wheezes, Coarse    Vitals:  Blood pressure 138/78, pulse 100, temperature 97.8 °F (36.6 °C), temperature source Oral, resp. rate 20, height 6' (1.829 m), SpO2 92%.    Plan    Respiratory Plan: Mild Distress pathway        Resp Comments: will trial pt on nebs, pt is current smoker and diagnosed with bronchitis. bbs ex wheezes/diminished/corase spo2 93% on ra

## 2024-12-09 NOTE — ASSESSMENT & PLAN NOTE
Patient on presentation due to acute wheezing cough and congestion starting  Patient reports being a current smoker.  Was never diagnosed for COPD in the past.  Received IV steroids and treatment in the ED however patient was noted to have O2 drop in 80s on room air while ambulating.  Admitted for possible COPD exacerbation vs acute bronchitis.  Chest x-ray unremarkable for pneumonia.  COVID/RSV/flu normal  Plan:  Will admit for treatment with nebulizer and steroids and monitor O2 sats.  Will start on azithromycin 500 mg for 3 days to cover for bronchitis and also for anti-inflammatory effect.  Pending procalcitonin  Patient will require PCP follow up and possible PFTs in the outpatient setting

## 2024-12-09 NOTE — PLAN OF CARE
Problem: PAIN - ADULT  Goal: Verbalizes/displays adequate comfort level or baseline comfort level  Description: Interventions:  - Encourage patient to monitor pain and request assistance  - Assess pain using appropriate pain scale  - Administer analgesics based on type and severity of pain and evaluate response  - Implement non-pharmacological measures as appropriate and evaluate response  - Consider cultural and social influences on pain and pain management  - Notify physician/advanced practitioner if interventions unsuccessful or patient reports new pain  Outcome: Progressing     Problem: INFECTION - ADULT  Goal: Absence or prevention of progression during hospitalization  Description: INTERVENTIONS:  - Assess and monitor for signs and symptoms of infection  - Monitor lab/diagnostic results  - Monitor all insertion sites, i.e. indwelling lines, tubes, and drains  - Monitor endotracheal if appropriate and nasal secretions for changes in amount and color  - Gray appropriate cooling/warming therapies per order  - Administer medications as ordered  - Instruct and encourage patient and family to use good hand hygiene technique  - Identify and instruct in appropriate isolation precautions for identified infection/condition  Outcome: Progressing  Goal: Absence of fever/infection during neutropenic period  Description: INTERVENTIONS:  - Monitor WBC    Outcome: Progressing     Problem: SAFETY ADULT  Goal: Patient will remain free of falls  Description: INTERVENTIONS:  - Educate patient/family on patient safety including physical limitations  - Instruct patient to call for assistance with activity   - Consult OT/PT to assist with strengthening/mobility   - Keep Call bell within reach  - Keep bed low and locked with side rails adjusted as appropriate  - Keep care items and personal belongings within reach  - Initiate and maintain comfort rounds  - Make Fall Risk Sign visible to staff  - Offer Toileting every 2 Hours,  in advance of need  - Initiate/Maintain bed alarm  - Obtain necessary fall risk management equipment: call bell within reach  - Apply yellow socks and bracelet for high fall risk patients  - Consider moving patient to room near nurses station  Outcome: Progressing  Goal: Maintain or return to baseline ADL function  Description: INTERVENTIONS:  -  Assess patient's ability to carry out ADLs; assess patient's baseline for ADL function and identify physical deficits which impact ability to perform ADLs (bathing, care of mouth/teeth, toileting, grooming, dressing, etc.)  - Assess/evaluate cause of self-care deficits   - Assess range of motion  - Assess patient's mobility; develop plan if impaired  - Assess patient's need for assistive devices and provide as appropriate  - Encourage maximum independence but intervene and supervise when necessary  - Involve family in performance of ADLs  - Assess for home care needs following discharge   - Consider OT consult to assist with ADL evaluation and planning for discharge  - Provide patient education as appropriate  Outcome: Progressing  Goal: Maintains/Returns to pre admission functional level  Description: INTERVENTIONS:  - Perform AM-PAC 6 Click Basic Mobility/ Daily Activity assessment daily.  - Set and communicate daily mobility goal to care team and patient/family/caregiver.   - Collaborate with rehabilitation services on mobility goals if consulted  - Perform Range of Motion 3 times a day.  - Reposition patient every 2 hours.  - Dangle patient 3 times a day  - Stand patient 3 times a day  - Ambulate patient 3 times a day  - Out of bed to chair 3 times a day   - Out of bed for meals 3 times a day  - Out of bed for toileting  - Record patient progress and toleration of activity level   Outcome: Progressing

## 2024-12-09 NOTE — ASSESSMENT & PLAN NOTE
Patient on presentation due to cough, sore throat, congestion and wheezing.  Chest x-ray unrevealing infiltrates concerning for pneumonia.  Questionable COPD exacerbation given patient current smoker.  Started azithromycin 500 mg for 3 days,  Started IV steroids and nebulizers

## 2024-12-10 LAB
ANION GAP SERPL CALCULATED.3IONS-SCNC: 6 MMOL/L (ref 4–13)
BASOPHILS # BLD AUTO: 0.01 THOUSANDS/ÂΜL (ref 0–0.1)
BASOPHILS NFR BLD AUTO: 0 % (ref 0–1)
BUN SERPL-MCNC: 12 MG/DL (ref 5–25)
CALCIUM SERPL-MCNC: 9.7 MG/DL (ref 8.4–10.2)
CHLORIDE SERPL-SCNC: 104 MMOL/L (ref 96–108)
CO2 SERPL-SCNC: 30 MMOL/L (ref 21–32)
CREAT SERPL-MCNC: 0.78 MG/DL (ref 0.6–1.3)
EOSINOPHIL # BLD AUTO: 0 THOUSAND/ÂΜL (ref 0–0.61)
EOSINOPHIL NFR BLD AUTO: 0 % (ref 0–6)
ERYTHROCYTE [DISTWIDTH] IN BLOOD BY AUTOMATED COUNT: 12.7 % (ref 11.6–15.1)
GFR SERPL CREATININE-BSD FRML MDRD: 92 ML/MIN/1.73SQ M
GLUCOSE SERPL-MCNC: 191 MG/DL (ref 65–140)
HCT VFR BLD AUTO: 37.8 % (ref 36.5–49.3)
HGB BLD-MCNC: 13.1 G/DL (ref 12–17)
IMM GRANULOCYTES # BLD AUTO: 0.04 THOUSAND/UL (ref 0–0.2)
IMM GRANULOCYTES NFR BLD AUTO: 1 % (ref 0–2)
LYMPHOCYTES # BLD AUTO: 1.05 THOUSANDS/ÂΜL (ref 0.6–4.47)
LYMPHOCYTES NFR BLD AUTO: 12 % (ref 14–44)
MCH RBC QN AUTO: 29.6 PG (ref 26.8–34.3)
MCHC RBC AUTO-ENTMCNC: 34.7 G/DL (ref 31.4–37.4)
MCV RBC AUTO: 85 FL (ref 82–98)
MONOCYTES # BLD AUTO: 0.78 THOUSAND/ÂΜL (ref 0.17–1.22)
MONOCYTES NFR BLD AUTO: 9 % (ref 4–12)
NEUTROPHILS # BLD AUTO: 7 THOUSANDS/ÂΜL (ref 1.85–7.62)
NEUTS SEG NFR BLD AUTO: 78 % (ref 43–75)
NRBC BLD AUTO-RTO: 0 /100 WBCS
PLATELET # BLD AUTO: 202 THOUSANDS/UL (ref 149–390)
PMV BLD AUTO: 10.4 FL (ref 8.9–12.7)
POTASSIUM SERPL-SCNC: 4.5 MMOL/L (ref 3.5–5.3)
PROCALCITONIN SERPL-MCNC: <0.05 NG/ML
RBC # BLD AUTO: 4.43 MILLION/UL (ref 3.88–5.62)
SODIUM SERPL-SCNC: 140 MMOL/L (ref 135–147)
WBC # BLD AUTO: 8.88 THOUSAND/UL (ref 4.31–10.16)

## 2024-12-10 PROCEDURE — 85025 COMPLETE CBC W/AUTO DIFF WBC: CPT

## 2024-12-10 PROCEDURE — 80048 BASIC METABOLIC PNL TOTAL CA: CPT

## 2024-12-10 PROCEDURE — 99232 SBSQ HOSP IP/OBS MODERATE 35: CPT | Performed by: STUDENT IN AN ORGANIZED HEALTH CARE EDUCATION/TRAINING PROGRAM

## 2024-12-10 PROCEDURE — 84145 PROCALCITONIN (PCT): CPT

## 2024-12-10 PROCEDURE — 94664 DEMO&/EVAL PT USE INHALER: CPT

## 2024-12-10 PROCEDURE — 94760 N-INVAS EAR/PLS OXIMETRY 1: CPT

## 2024-12-10 PROCEDURE — 94640 AIRWAY INHALATION TREATMENT: CPT

## 2024-12-10 RX ORDER — NICOTINE 21 MG/24HR
14 PATCH, TRANSDERMAL 24 HOURS TRANSDERMAL ONCE
Status: DISCONTINUED | OUTPATIENT
Start: 2024-12-10 | End: 2024-12-11 | Stop reason: HOSPADM

## 2024-12-10 RX ORDER — PREDNISONE 20 MG/1
40 TABLET ORAL DAILY
Status: DISCONTINUED | OUTPATIENT
Start: 2024-12-11 | End: 2024-12-11 | Stop reason: HOSPADM

## 2024-12-10 RX ADMIN — LEVALBUTEROL HYDROCHLORIDE 1.25 MG: 1.25 SOLUTION RESPIRATORY (INHALATION) at 07:11

## 2024-12-10 RX ADMIN — GABAPENTIN 300 MG: 300 CAPSULE ORAL at 16:06

## 2024-12-10 RX ADMIN — LEVALBUTEROL HYDROCHLORIDE 1.25 MG: 1.25 SOLUTION RESPIRATORY (INHALATION) at 13:09

## 2024-12-10 RX ADMIN — IPRATROPIUM BROMIDE 0.5 MG: 0.5 SOLUTION RESPIRATORY (INHALATION) at 13:09

## 2024-12-10 RX ADMIN — IPRATROPIUM BROMIDE 0.5 MG: 0.5 SOLUTION RESPIRATORY (INHALATION) at 07:11

## 2024-12-10 RX ADMIN — GABAPENTIN 300 MG: 300 CAPSULE ORAL at 08:17

## 2024-12-10 RX ADMIN — METHYLPREDNISOLONE SODIUM SUCCINATE 40 MG: 40 INJECTION, POWDER, FOR SOLUTION INTRAMUSCULAR; INTRAVENOUS at 09:07

## 2024-12-10 RX ADMIN — IPRATROPIUM BROMIDE 0.5 MG: 0.5 SOLUTION RESPIRATORY (INHALATION) at 19:40

## 2024-12-10 RX ADMIN — LEVALBUTEROL HYDROCHLORIDE 1.25 MG: 1.25 SOLUTION RESPIRATORY (INHALATION) at 19:40

## 2024-12-10 RX ADMIN — TAMSULOSIN HYDROCHLORIDE 0.4 MG: 0.4 CAPSULE ORAL at 16:06

## 2024-12-10 RX ADMIN — AMLODIPINE BESYLATE 10 MG: 10 TABLET ORAL at 08:17

## 2024-12-10 RX ADMIN — AZITHROMYCIN 500 MG: 500 TABLET, FILM COATED ORAL at 13:47

## 2024-12-10 RX ADMIN — NICOTINE 14 MG: 14 PATCH, EXTENDED RELEASE TRANSDERMAL at 17:46

## 2024-12-10 RX ADMIN — GABAPENTIN 300 MG: 300 CAPSULE ORAL at 21:06

## 2024-12-10 NOTE — RESPIRATORY THERAPY NOTE
RT Protocol Note  Daniel Llanos 69 y.o. male MRN: [id number]  Unit/Bed#: -01 Encounter: [id number]    Assessment    Principal Problem:    Wheezing  Active Problems:    Tobacco abuse    Primary hypertension    BPH (benign prostatic hyperplasia)    Acute bronchitis due to other specified organisms      Home Pulmonary Medications:     12/09/24 1917   Respiratory Protocol   Protocol Initiated? Yes   Protocol Selection Respiratory   Language Barrier? No   Medical & Social History Reviewed? Yes   Diagnostic Studies Reviewed? Yes   Physical Assessment Performed? Yes   Respiratory Plan Mild Distress pathway   Respiratory Assessment   Assessment Type During-treatment   General Appearance Awake;Alert   Respiratory Pattern Normal   Chest Assessment Chest expansion symmetrical   Bilateral Breath Sounds Rhonchi;Coarse   Location Specific No   Cough None   Resp Comments Will continue with current tx plan   O2 Device RA   Cough Description   Sputum Amount None   Additional Assessments   Pulse 88   Respirations 18   SpO2 92 %            History reviewed. No pertinent past medical history.  Social History     Socioeconomic History    Marital status: /Civil Union     Spouse name: None    Number of children: None    Years of education: None    Highest education level: None   Occupational History    None   Tobacco Use    Smoking status: Unknown    Smokeless tobacco: None   Vaping Use    Vaping status: Never Used   Substance and Sexual Activity    Alcohol use: Never    Drug use: Never    Sexual activity: None   Other Topics Concern    None   Social History Narrative    None     Social Drivers of Health     Financial Resource Strain: Not on file   Food Insecurity: No Food Insecurity (12/9/2024)    Nursing - Inadequate Food Risk Classification     Worried About Running Out of Food in the Last Year: Not on file     Ran Out of Food in the Last Year: Not on file     Ran Out of Food in the Last Year: 1   Transportation Needs: No  Transportation Needs (2024)    Nursing - Transportation Risk Classification     Lack of Transportation: Not on file     Lack of Transportation: 2   Physical Activity: Not on file   Stress: Not on file   Social Connections: Not on file   Intimate Partner Violence: Unknown (2024)    Nursing IPS     Feels Physically and Emotionally Safe: Not on file     Physically Hurt by Someone: Not on file     Humiliated or Emotionally Abused by Someone: Not on file     Physically Hurt by Someone: 2     Hurt or Threatened by Someone: 2   Housing Stability: Unknown (2024)    Nursing: Inadequate Housing Risk Classification     Has Housing: Not on file     Worried About Losing Housing: Not on file     Unable to Get Utilities: Not on file     Unable to Pay for Housing in the Last Year: 2     Has Housin       Subjective         Objective    Physical Exam:   Assessment Type: During-treatment  General Appearance: Awake, Alert  Respiratory Pattern: Normal  Chest Assessment: Chest expansion symmetrical  Bilateral Breath Sounds: Rhonchi, Coarse  Location Specific: No  Cough: None  O2 Device: RA    Vitals:  Blood pressure 124/70, pulse 88, temperature 98.3 °F (36.8 °C), resp. rate 18, height 6' (1.829 m), weight 83.9 kg (184 lb 15.5 oz), SpO2 92%.          Imaging and other studies: Results Review Statement: No pertinent imaging studies reviewed.    O2 Device: RA     Plan    Respiratory Plan: Mild Distress pathway        Resp Comments: Will continue with current tx plan

## 2024-12-10 NOTE — PROGRESS NOTES
Progress Note - Hospitalist   Name: Daniel Llaons 69 y.o. male I MRN: [id number]  Unit/Bed#: -01 I Date of Admission: 12/9/2024   Date of Service: 12/10/2024 I Hospital Day: 1    Assessment & Plan  Wheezing  Patient on presentation due to acute wheezing cough and congestion starting  Patient reports being a current smoker.  Was never diagnosed for COPD in the past.  Received IV steroids and treatment in the ED however patient was noted to have O2 drop in 80s on room air while ambulating.  Admitted for possible COPD exacerbation vs acute bronchitis.  Chest x-ray unremarkable for pneumonia.  COVID/RSV/flu normal  Plan:  Suspect secondary to emphysema/COPD exacerbation.  Patient has no official diagnosis of COPD or emphysema however chronic smoker.  Currently reports overall feeling better.  O2 saturation noted 94 to 95% on room air.  Currently on IV Solu-Medrol 40 mg daily.  Transitioning to p.o. prednisone starting tomorrow.  Complete 3 days of azithromycin course.  Tentative plan for discharge tomorrow.  Recommend outpatient follow-up with pulmonology for PFT.  Patient is in agreement with above plan.  Acute bronchitis due to other specified organisms  Plan as above.  Tobacco abuse  Counseled on smoking cessation for more than 3 minutes.    Primary hypertension  Reports taking amlodipine 10 mg daily  Will continue home regimen  BPH (benign prostatic hyperplasia)  Tamsulosin 0.4 mg  Continue home regimen    VTE Pharmacologic Prophylaxis: VTE Score: 5 Moderate Risk (Score 3-4) - Pharmacological DVT Prophylaxis Ordered: enoxaparin (Lovenox).    Mobility:   Basic Mobility Inpatient Raw Score: 24  JH-HLM Goal: 8: Walk 250 feet or more  JH-HLM Achieved: 8: Walk 250 feet ot more      Patient Centered Rounds: I performed bedside rounds with nursing staff today.     Current Length of Stay: 1 day(s)  Current Patient Status: Inpatient   Certification Statement: The patient will continue to require additional inpatient  hospital stay due to tapering steroids  Discharge Plan: Anticipate discharge tomorrow to home.    Code Status: Level 1 - Full Code    Subjective   Seen during hemodialysis.  Patient appears comfortable not in distress.  O2 saturation 94 to 95% on room air at rest.  Overall patient does report feeling much better.  Denies any other new complaints.    Objective :  Temp:  [97.8 °F (36.6 °C)-98.3 °F (36.8 °C)] 97.8 °F (36.6 °C)  HR:  [] 91  BP: (124-144)/(70-78) 138/70  Resp:  [14-27] 14  SpO2:  [88 %-99 %] 92 %  O2 Device: None (Room air)    Body mass index is 25.09 kg/m².     Input and Output Summary (last 24 hours):     Intake/Output Summary (Last 24 hours) at 12/10/2024 1131  Last data filed at 12/10/2024 0820  Gross per 24 hour   Intake 1700 ml   Output --   Net 1700 ml       Physical Exam  Constitutional:       General: He is not in acute distress.     Appearance: He is not ill-appearing, toxic-appearing or diaphoretic.   Cardiovascular:      Rate and Rhythm: Normal rate.      Pulses: Normal pulses.   Pulmonary:      Effort: Pulmonary effort is normal. No respiratory distress.      Breath sounds: Normal breath sounds. No wheezing.   Abdominal:      General: Bowel sounds are normal. There is no distension.      Palpations: Abdomen is soft.      Tenderness: There is no abdominal tenderness.   Musculoskeletal:      Right lower leg: No edema.      Left lower leg: No edema.   Neurological:      Mental Status: He is alert and oriented to person, place, and time. Mental status is at baseline.   Psychiatric:         Mood and Affect: Mood normal.         Behavior: Behavior normal.           Lines/Drains:              Lab Results: I have reviewed the following results:   Results from last 7 days   Lab Units 12/10/24  0505 12/09/24  1023   WBC Thousand/uL 8.88 10.47*   HEMOGLOBIN g/dL 13.1 13.5   HEMATOCRIT % 37.8 39.2   PLATELETS Thousands/uL 202 195   BANDS PCT %  --  2   SEGS PCT % 78*  --    LYMPHO PCT % 12* 22    MONO PCT % 9 11   EOS PCT % 0 2     Results from last 7 days   Lab Units 12/10/24  0505 12/09/24  1023   SODIUM mmol/L 140 136   POTASSIUM mmol/L 4.5 4.2   CHLORIDE mmol/L 104 100   CO2 mmol/L 30 29   BUN mg/dL 12 10   CREATININE mg/dL 0.78 0.89   ANION GAP mmol/L 6 7   CALCIUM mg/dL 9.7 9.0   ALBUMIN g/dL  --  3.9   TOTAL BILIRUBIN mg/dL  --  0.83   ALK PHOS U/L  --  48   ALT U/L  --  9   AST U/L  --  10*   GLUCOSE RANDOM mg/dL 191* 104                 Results from last 7 days   Lab Units 12/10/24  0505 12/09/24  1236   LACTIC ACID mmol/L  --  1.0   PROCALCITONIN ng/ml <0.05 <0.05       Recent Cultures (last 7 days):   Results from last 7 days   Lab Units 12/09/24  1239 12/09/24  1236   BLOOD CULTURE  Received in Microbiology Lab. Culture in Progress. Received in Microbiology Lab. Culture in Progress.       Imaging Results Review: I reviewed radiology reports from this admission including: chest xray.      Last 24 Hours Medication List:     Current Facility-Administered Medications:     acetaminophen (TYLENOL) tablet 975 mg, Q6H PRN    albuterol (PROVENTIL HFA,VENTOLIN HFA) inhaler 2 puff, Q6H PRN    amLODIPine (NORVASC) tablet 10 mg, Daily    azithromycin (ZITHROMAX) tablet 500 mg, Q24H    enoxaparin (LOVENOX) subcutaneous injection 40 mg, Daily    gabapentin (NEURONTIN) capsule 300 mg, TID    ipratropium (ATROVENT) 0.02 % inhalation solution 0.5 mg, TID    levalbuterol (XOPENEX) inhalation solution 1.25 mg, TID    methylPREDNISolone sodium succinate (Solu-MEDROL) injection 40 mg, Q12H DIANA    nicotine (NICODERM CQ) 14 mg/24hr TD 24 hr patch 14 mg, Once    Insert peripheral IV, Once **AND** sodium chloride (PF) 0.9 % injection 3 mL, Q1H PRN    tamsulosin (FLOMAX) capsule 0.4 mg, Daily With Dinner    Administrative Statements   Today, Patient Was Seen By: Adam Ayala MD  I have spent a total time of 35 minutes in caring for this patient on the day of the visit/encounter including Risks and benefits of tx  options, Instructions for management, Impressions, Counseling / Coordination of care, Documenting in the medical record, Reviewing / ordering tests, medicine, procedures  , and Obtaining or reviewing history  .    **Please Note: This note may have been constructed using a voice recognition system.**

## 2024-12-10 NOTE — ASSESSMENT & PLAN NOTE
Patient on presentation due to acute wheezing cough and congestion starting  Patient reports being a current smoker.  Was never diagnosed for COPD in the past.  Received IV steroids and treatment in the ED however patient was noted to have O2 drop in 80s on room air while ambulating.  Admitted for possible COPD exacerbation vs acute bronchitis.  Chest x-ray unremarkable for pneumonia.  COVID/RSV/flu normal  Plan:  Suspect secondary to emphysema/COPD exacerbation.  Patient has no official diagnosis of COPD or emphysema however chronic smoker.  Currently reports overall feeling better.  O2 saturation noted 94 to 95% on room air.  Currently on IV Solu-Medrol 40 mg daily.  Transitioning to p.o. prednisone starting tomorrow.  Complete 3 days of azithromycin course.  Tentative plan for discharge tomorrow.  Recommend outpatient follow-up with pulmonology for PFT.  Patient is in agreement with above plan.

## 2024-12-10 NOTE — NURSING NOTE
Ambulated patient in hallways with pulse ox on. Patients o2 went as low as 92% but did not sustain. Patient denies and chest pain or SOB. Provider aware. Patient now resting comfortably in room with masimo on and call bell within reach. No complaints at this time.

## 2024-12-10 NOTE — RESPIRATORY THERAPY NOTE
RT Protocol Note  Daniel Llanos 69 y.o. male MRN: [id number]  Unit/Bed#: -01 Encounter: [id number]    Assessment    Principal Problem:    Wheezing  Active Problems:    Tobacco abuse    Primary hypertension    BPH (benign prostatic hyperplasia)    Acute bronchitis due to other specified organisms      Home Pulmonary Medications:     12/10/24 0713   Respiratory Protocol   Protocol Initiated? No   Protocol Selection Respiratory   Language Barrier? No   Medical & Social History Reviewed? Yes   Diagnostic Studies Reviewed? Yes   Physical Assessment Performed? Yes   Respiratory Plan Mild Distress pathway   Respiratory Assessment   Assessment Type Pre-treatment   General Appearance Alert;Awake   Respiratory Pattern Normal   Chest Assessment Chest expansion symmetrical   Bilateral Breath Sounds Diminished   Resp Comments continue as ordered   O2 Device ra   Additional Assessments   SpO2 99 %            History reviewed. No pertinent past medical history.  Social History     Socioeconomic History    Marital status: /Civil Union     Spouse name: None    Number of children: None    Years of education: None    Highest education level: None   Occupational History    None   Tobacco Use    Smoking status: Unknown    Smokeless tobacco: None   Vaping Use    Vaping status: Never Used   Substance and Sexual Activity    Alcohol use: Never    Drug use: Never    Sexual activity: None   Other Topics Concern    None   Social History Narrative    None     Social Drivers of Health     Financial Resource Strain: Not on file   Food Insecurity: No Food Insecurity (12/9/2024)    Nursing - Inadequate Food Risk Classification     Worried About Running Out of Food in the Last Year: Not on file     Ran Out of Food in the Last Year: Not on file     Ran Out of Food in the Last Year: 1   Transportation Needs: No Transportation Needs (12/9/2024)    Nursing - Transportation Risk Classification     Lack of Transportation: Not on file     Lack  of Transportation: 2   Physical Activity: Not on file   Stress: Not on file   Social Connections: Not on file   Intimate Partner Violence: Unknown (2024)    Nursing IPS     Feels Physically and Emotionally Safe: Not on file     Physically Hurt by Someone: Not on file     Humiliated or Emotionally Abused by Someone: Not on file     Physically Hurt by Someone: 2     Hurt or Threatened by Someone: 2   Housing Stability: Unknown (2024)    Nursing: Inadequate Housing Risk Classification     Has Housing: Not on file     Worried About Losing Housing: Not on file     Unable to Get Utilities: Not on file     Unable to Pay for Housing in the Last Year: 2     Has Housin       Subjective         Objective    Physical Exam:   Assessment Type: Pre-treatment  General Appearance: Alert, Awake  Respiratory Pattern: Normal  Chest Assessment: Chest expansion symmetrical  Bilateral Breath Sounds: Diminished  O2 Device: ra    Vitals:  Blood pressure 138/70, pulse 91, temperature 97.8 °F (36.6 °C), temperature source Oral, resp. rate 14, height 6' (1.829 m), weight 83.9 kg (184 lb 15.5 oz), SpO2 90%.          Imaging and other studies:     O2 Device: ra     Plan    Respiratory Plan: Mild Distress pathway        Resp Comments: continue as ordered

## 2024-12-10 NOTE — PLAN OF CARE
Problem: PAIN - ADULT  Goal: Verbalizes/displays adequate comfort level or baseline comfort level  Description: Interventions:  - Encourage patient to monitor pain and request assistance  - Assess pain using appropriate pain scale  - Administer analgesics based on type and severity of pain and evaluate response  - Implement non-pharmacological measures as appropriate and evaluate response  - Consider cultural and social influences on pain and pain management  - Notify physician/advanced practitioner if interventions unsuccessful or patient reports new pain  Outcome: Progressing     Problem: INFECTION - ADULT  Goal: Absence or prevention of progression during hospitalization  Description: INTERVENTIONS:  - Assess and monitor for signs and symptoms of infection  - Monitor lab/diagnostic results  - Monitor all insertion sites, i.e. indwelling lines, tubes, and drains  - Monitor endotracheal if appropriate and nasal secretions for changes in amount and color  - [place] appropriate cooling/warming therapies per order  - Administer medications as ordered  - Instruct and encourage patient and family to use good hand hygiene technique  - Identify and instruct in appropriate isolation precautions for identified infection/condition  Outcome: Progressing  Goal: Absence of fever/infection during neutropenic period  Description: INTERVENTIONS:  - Monitor WBC    Outcome: Progressing     Problem: SAFETY ADULT  Goal: Patient will remain free of falls  Description: INTERVENTIONS:  - Educate patient/family on patient safety including physical limitations  - Instruct patient to call for assistance with activity   - Consult OT/PT to assist with strengthening/mobility   - Keep Call bell within reach  - Keep bed low and locked with side rails adjusted as appropriate  - Keep care items and personal belongings within reach  - Initiate and maintain comfort rounds  - Make Fall Risk Sign visible to staff  - Offer Toileting every 2 Hours,  in advance of need  - Apply yellow socks and bracelet for high fall risk patients  - Consider moving patient to room near nurses station  Outcome: Progressing  Goal: Maintain or return to baseline ADL function  Description: INTERVENTIONS:  -  Assess patient's ability to carry out ADLs; assess patient's baseline for ADL function and identify physical deficits which impact ability to perform ADLs (bathing, care of mouth/teeth, toileting, grooming, dressing, etc.)  - Assess/evaluate cause of self-care deficits   - Assess range of motion  - Assess patient's mobility; develop plan if impaired  - Assess patient's need for assistive devices and provide as appropriate  - Encourage maximum independence but intervene and supervise when necessary  - Involve family in performance of ADLs  - Assess for home care needs following discharge   - Consider OT consult to assist with ADL evaluation and planning for discharge  - Provide patient education as appropriate  Outcome: Progressing  Goal: Maintains/Returns to pre admission functional level  Description: INTERVENTIONS:  - Perform AM-PAC 6 Click Basic Mobility/ Daily Activity assessment daily.  - Set and communicate daily mobility goal to care team and patient/family/caregiver.   - Collaborate with rehabilitation services on mobility goals if consulted  - Perform Range of Motion 3 times a day.  - Reposition patient every 2 hours.  - Dangle patient 3 times a day  - Stand patient 3 times a day  - Ambulate patient 3 times a day  - Out of bed to chair 3 times a day   - Out of bed for meals 3 times a day  - Out of bed for toileting  - Record patient progress and toleration of activity level   Outcome: Progressing     Problem: DISCHARGE PLANNING  Goal: Discharge to home or other facility with appropriate resources  Description: INTERVENTIONS:  - Identify barriers to discharge w/patient and caregiver  - Arrange for needed discharge resources and transportation as appropriate  - Identify  discharge learning needs (meds, wound care, etc.)  - Arrange for interpretive services to assist at discharge as needed  - Refer to Case Management Department for coordinating discharge planning if the patient needs post-hospital services based on physician/advanced practitioner order or complex needs related to functional status, cognitive ability, or social support system  Outcome: Progressing     Problem: Knowledge Deficit  Goal: Patient/family/caregiver demonstrates understanding of disease process, treatment plan, medications, and discharge instructions  Description: Complete learning assessment and assess knowledge base.  Interventions:  - Provide teaching at level of understanding  - Provide teaching via preferred learning methods  Outcome: Progressing     Problem: RESPIRATORY - ADULT  Goal: Achieves optimal ventilation and oxygenation  Description: INTERVENTIONS:  - Assess for changes in respiratory status  - Assess for changes in mentation and behavior  - Position to facilitate oxygenation and minimize respiratory effort  - Oxygen administered by appropriate delivery if ordered  - Initiate smoking cessation education as indicated  - Encourage broncho-pulmonary hygiene including cough, deep breathe, Incentive Spirometry  - Assess the need for suctioning and aspirate as needed  - Assess and instruct to report SOB or any respiratory difficulty  - Respiratory Therapy support as indicated  Outcome: Progressing

## 2024-12-10 NOTE — UTILIZATION REVIEW
Initial Clinical Review    Admission: Date/Time/Statement:   Admission Orders (From admission, onward)       Ordered        12/09/24 1233  Inpatient Admission  Once                          Orders Placed This Encounter   Procedures    Inpatient Admission     Standing Status:   Standing     Number of Occurrences:   1     Level of Care:   Med Surg [16]     Estimated length of stay:   More than 2 Midnights     Certification:   I certify that inpatient services are medically necessary for this patient for a duration of greater than two midnights. See H&P and MD Progress Notes for additional information about the patient's course of treatment.     ED Arrival Information       Expected   -    Arrival   12/9/2024 08:44    Acuity   Urgent              Means of arrival   Walk-In    Escorted by   Self    Service   Hospitalist    Admission type   Emergency              Arrival complaint   cough,congestion, sore thoat             Chief Complaint   Patient presents with    Cough     Cough, congestion, sore throat, and chills since last Thursday. Sick contact with child.        Initial Presentation: 69 y.o. male to ED from home w/ PMH of tobacco abuse, hypertension, BPH who presents with upper respiratory infection such as cough, sore throat, chills, congestion.  Symptoms started on Thursday.  Patient reports that his son also got sick.  In the ED patient was noted to have diffuse wheezing and rhonchi.  Was given oral steroids and nebulizers.  Patient was ambulated to assess the oxygen level and was noted to drop into the 80s. Admitted IP status w/ wheezing , possible COPD exacerbation plan for azithromycin , f/u pct , monitor O2 sat . HTN cont home med.     Anticipated Length of Stay/Certification Statement: Patient will be admitted on an inpatient basis with an anticipated length of stay of greater than 2 midnights secondary to cough, wheezing, hypoxia in the ED.     Date: 12/10    Day 2: O2 sat 94-95 % on RA at rest . Feeling  better. Cont iv solumedrol . Transitioning to p.o. prednisone starting tomorrow.  Complete 3 days of azithromycin course. Wbc dec to 8.88 from 10.47 .     Date: 12/11  Day 3: Has surpassed a 2nd midnight with active treatments and services.  Breath sounds diminished . Dry NP cough . Transitioned to po prednisone . O2 sat 91 % on RA . Pt discharged to home .       ED Treatment-Medication Administration from 12/09/2024 0844 to 12/09/2024 1331         Date/Time Order Dose Route Action     12/09/2024 1024 sodium chloride 0.9 % bolus 1,000 mL 1,000 mL Intravenous New Bag     12/09/2024 1019 albuterol inhalation solution 5 mg 5 mg Nebulization Given     12/09/2024 1019 ipratropium (ATROVENT) 0.02 % inhalation solution 0.5 mg 0.5 mg Nebulization Given     12/09/2024 1019 predniSONE tablet 60 mg 60 mg Oral Given     12/09/2024 1247 ceftriaxone (ROCEPHIN) 2 g/50 mL in dextrose IVPB 2,000 mg Intravenous New Bag     12/09/2024 1239 albuterol inhalation solution 5 mg 5 mg Nebulization Given     12/09/2024 1239 ipratropium (ATROVENT) 0.02 % inhalation solution 0.5 mg 0.5 mg Nebulization Given            Scheduled Medications:  amLODIPine, 10 mg, Oral, Daily  azithromycin, 500 mg, Oral, Q24H  enoxaparin, 40 mg, Subcutaneous, Daily  gabapentin, 300 mg, Oral, TID  ipratropium, 0.5 mg, Nebulization, TID  levalbuterol, 1.25 mg, Nebulization, TID  nicotine, 14 mg, Transdermal, Once  predniSONE, 40 mg, Oral, Daily  tamsulosin, 0.4 mg, Oral, Daily With Dinner      Continuous IV Infusions:     PRN Meds:  acetaminophen, 975 mg, Oral, Q6H PRN  albuterol, 2 puff, Inhalation, Q6H PRN  sodium chloride (PF), 3 mL, Intravenous, Q1H PRN      ED Triage Vitals   Temperature Pulse Respirations Blood Pressure SpO2 Pain Score   12/09/24 0849 12/09/24 0849 12/09/24 0849 12/09/24 0849 12/09/24 0849 12/09/24 1436   97.7 °F (36.5 °C) 101 18 124/70 93 % No Pain     Weight (last 2 days)       Date/Time Weight    12/09/24 1435 83.9 (184.97)            Vital  Signs (last 3 days)       Date/Time Temp Pulse Resp BP MAP (mmHg) SpO2 O2 Device Patient Position - Orthostatic VS Pain    12/11/24 0710 -- -- -- -- -- 91 % None (Room air) -- --    12/10/24 22:14:16 97.9 °F (36.6 °C) 94 20 127/76 93 88 % -- -- --    12/10/24 2100 -- -- -- -- -- -- None (Room air) -- No Pain    12/10/24 1940 -- 83 20 -- -- 91 % -- -- --    12/10/24 15:06:39 98.1 °F (36.7 °C) 90 16 131/77 95 91 % -- -- --    12/10/24 1312 -- -- -- -- -- 95 % None (Room air) -- --    12/10/24 1200 -- -- -- -- -- 92 % None (Room air) -- --    12/10/24 0921 -- -- -- -- -- 92 % -- -- --    12/10/24 08:20:06 -- 91 14 138/70 93 90 % -- -- --    12/10/24 0815 -- -- -- -- -- -- -- -- No Pain    12/10/24 0713 -- -- -- -- -- 99 % None (Room air) -- --    12/10/24 07:06:54 97.8 °F (36.6 °C) 77 17 144/78 100 96 % -- Sitting No Pain    12/09/24 1945 -- -- -- -- -- -- None (Room air) -- No Pain    12/09/24 1917 -- 88 18 -- -- 92 % -- -- --    12/09/24 15:50:31 98.3 °F (36.8 °C) 100 17 124/70 88 88 % -- -- --    12/09/24 1449 -- 100 20 -- -- 92 % -- -- --    12/09/24 1436 -- -- -- -- -- -- -- -- No Pain    12/09/24 1435 97.8 °F (36.6 °C) 82 22 138/78 -- 94 % -- -- --    12/09/24 13:43:03 -- 109 -- 125/78 94 94 % None (Room air) -- --    12/09/24 1341 -- -- -- 125/78 -- -- -- -- --    12/09/24 1217 -- -- -- -- -- -- None (Room air) -- --    12/09/24 1215 -- 89 27 -- -- 94 % None (Room air) -- --    12/09/24 1200 -- 82 22 138/78 102 90 % -- -- --    12/09/24 1130 -- 89 21 139/76 102 93 % -- -- --    12/09/24 0849 97.7 °F (36.5 °C) 101 18 124/70 91 93 % None (Room air) Sitting --              Pertinent Labs/Diagnostic Test Results:   Radiology:  X-ray chest 2 views   Final Interpretation by Alejandro Gonzalez DO (12/09 1406)      No acute cardiopulmonary disease.            Resident: Zenaida Kellogg I, the attending radiologist, have reviewed the images and agree with the final report above.      Workstation performed:  [id number]           Cardiology:  ECG 12 lead   Final Result by Sully Cardona MD (12/09 1414)   Normal sinus rhythm   Nonspecific ST and T wave abnormality   No previous ECGs available   Confirmed by Sully Cardona ([id number]) on 12/9/2024 2:14:20 PM        GI:  No orders to display           Results from last 7 days   Lab Units 12/10/24  0505 12/09/24  1023   WBC Thousand/uL 8.88 10.47*   HEMOGLOBIN g/dL 13.1 13.5   HEMATOCRIT % 37.8 39.2   PLATELETS Thousands/uL 202 195   TOTAL NEUT ABS Thousands/µL 7.00  --    BANDS PCT %  --  2         Results from last 7 days   Lab Units 12/10/24  0505 12/09/24  1023   SODIUM mmol/L 140 136   POTASSIUM mmol/L 4.5 4.2   CHLORIDE mmol/L 104 100   CO2 mmol/L 30 29   ANION GAP mmol/L 6 7   BUN mg/dL 12 10   CREATININE mg/dL 0.78 0.89   EGFR ml/min/1.73sq m 92 87   CALCIUM mg/dL 9.7 9.0     Results from last 7 days   Lab Units 12/09/24  1023   AST U/L 10*   ALT U/L 9   ALK PHOS U/L 48   TOTAL PROTEIN g/dL 6.9   ALBUMIN g/dL 3.9   TOTAL BILIRUBIN mg/dL 0.83         Results from last 7 days   Lab Units 12/10/24  0505 12/09/24  1023   GLUCOSE RANDOM mg/dL 191* 104     Results from last 7 days   Lab Units 12/09/24  1023   HS TNI 0HR ng/L 4         Results from last 7 days   Lab Units 12/10/24  0505 12/09/24  1236   PROCALCITONIN ng/ml <0.05 <0.05     Results from last 7 days   Lab Units 12/09/24  1236   LACTIC ACID mmol/L 1.0       Results from last 7 days   Lab Units 12/09/24  1239 12/09/24  1236   BLOOD CULTURE  No Growth at 24 hrs. No Growth at 24 hrs.     History reviewed. No pertinent past medical history.  Present on Admission:  **None**      Admitting Diagnosis: Acute bronchitis [J20.9]  Cough [R05.9]  Hypoxia [R09.02]  Age/Sex: 69 y.o. male    Network Utilization Review Department  ATTENTION: Please call with any questions or concerns to [phone] and carefully listen to the prompts so that you are directed to the right person. All voicemails are confidential.   For  Discharge needs, contact Care Management DC Support Team at [phone] opt. [phone]  Send all requests for admission clinical reviews, approved or denied determinations and any other requests to dedicated fax number below belonging to the campus where the patient is receiving treatment. List of dedicated fax numbers for the Facilities:  FACILITY NAME UR FAX NUMBER   ADMISSION DENIALS (Administrative/Medical Necessity) [phone]   DISCHARGE SUPPORT TEAM (NETWORK) [phone]   PARENT CHILD HEALTH (Maternity/NICU/Pediatrics) [phone]   [facility] [phone]   [facility] [phone]   [facility] [phone]   [facility] [phone]   [facility] [phone]   General acute hospital [phone]   [facility] [phone]   [facility] [phone]   [facility] [phone]   [facility] [phone]   General acute hospital [phone]   [facility] [phone]

## 2024-12-11 VITALS
BODY MASS INDEX: 25.05 KG/M2 | WEIGHT: 184.97 LBS | OXYGEN SATURATION: 97 % | HEIGHT: 72 IN | TEMPERATURE: 97.9 F | RESPIRATION RATE: 20 BRPM | HEART RATE: 79 BPM | DIASTOLIC BLOOD PRESSURE: 79 MMHG | SYSTOLIC BLOOD PRESSURE: 127 MMHG

## 2024-12-11 LAB
DME PARACHUTE DELIVERY DATE REQUESTED: NORMAL
DME PARACHUTE ITEM DESCRIPTION: NORMAL
DME PARACHUTE ORDER STATUS: NORMAL
DME PARACHUTE SUPPLIER NAME: NORMAL
DME PARACHUTE SUPPLIER PHONE: NORMAL

## 2024-12-11 PROCEDURE — 94640 AIRWAY INHALATION TREATMENT: CPT

## 2024-12-11 PROCEDURE — 99239 HOSP IP/OBS DSCHRG MGMT >30: CPT | Performed by: STUDENT IN AN ORGANIZED HEALTH CARE EDUCATION/TRAINING PROGRAM

## 2024-12-11 PROCEDURE — 94760 N-INVAS EAR/PLS OXIMETRY 1: CPT

## 2024-12-11 RX ORDER — ALBUTEROL SULFATE 90 UG/1
2 INHALANT RESPIRATORY (INHALATION) EVERY 6 HOURS PRN
Qty: 18 G | Refills: 0 | Status: SHIPPED | OUTPATIENT
Start: 2024-12-11 | End: 2024-12-11

## 2024-12-11 RX ORDER — ALBUTEROL SULFATE 0.83 MG/ML
2.5 SOLUTION RESPIRATORY (INHALATION) EVERY 6 HOURS PRN
Qty: 125 ML | Refills: 0 | Status: SHIPPED | OUTPATIENT
Start: 2024-12-11 | End: 2024-12-11

## 2024-12-11 RX ORDER — ALBUTEROL SULFATE 0.83 MG/ML
2.5 SOLUTION RESPIRATORY (INHALATION) EVERY 6 HOURS PRN
Qty: 125 ML | Refills: 0 | Status: SHIPPED | OUTPATIENT
Start: 2024-12-11

## 2024-12-11 RX ORDER — AZITHROMYCIN 250 MG/1
250 TABLET, FILM COATED ORAL EVERY 24 HOURS
Qty: 1 TABLET | Refills: 0 | Status: SHIPPED | OUTPATIENT
Start: 2024-12-11 | End: 2024-12-11

## 2024-12-11 RX ORDER — PREDNISONE 20 MG/1
TABLET ORAL
Qty: 15 TABLET | Refills: 0 | Status: SHIPPED | OUTPATIENT
Start: 2024-12-12 | End: 2024-12-24

## 2024-12-11 RX ORDER — ALBUTEROL SULFATE 90 UG/1
2 INHALANT RESPIRATORY (INHALATION) EVERY 6 HOURS PRN
Qty: 18 G | Refills: 0 | Status: SHIPPED | OUTPATIENT
Start: 2024-12-11

## 2024-12-11 RX ORDER — PREDNISONE 20 MG/1
TABLET ORAL
Qty: 15 TABLET | Refills: 0 | Status: SHIPPED | OUTPATIENT
Start: 2024-12-12 | End: 2024-12-11

## 2024-12-11 RX ORDER — AZITHROMYCIN 250 MG/1
250 TABLET, FILM COATED ORAL EVERY 24 HOURS
Qty: 1 TABLET | Refills: 0 | Status: SHIPPED | OUTPATIENT
Start: 2024-12-11 | End: 2024-12-12

## 2024-12-11 RX ADMIN — IPRATROPIUM BROMIDE 0.5 MG: 0.5 SOLUTION RESPIRATORY (INHALATION) at 07:10

## 2024-12-11 RX ADMIN — LEVALBUTEROL HYDROCHLORIDE 1.25 MG: 1.25 SOLUTION RESPIRATORY (INHALATION) at 07:10

## 2024-12-11 RX ADMIN — AMLODIPINE BESYLATE 10 MG: 10 TABLET ORAL at 08:11

## 2024-12-11 RX ADMIN — GABAPENTIN 300 MG: 300 CAPSULE ORAL at 08:11

## 2024-12-11 RX ADMIN — AZITHROMYCIN 500 MG: 500 TABLET, FILM COATED ORAL at 11:52

## 2024-12-11 RX ADMIN — PREDNISONE 40 MG: 20 TABLET ORAL at 08:11

## 2024-12-11 NOTE — NURSING NOTE
AVS reviewed with pt. All questions answered at this time. Nebulizer given to pt at bedside. Pt instructed to call nurses station if meds are not called into [facility] as they are [facility].  made aware and is calling in to [facility] pharmacy. Pt to transport self home.

## 2024-12-11 NOTE — PROGRESS NOTES
Pastoral Care Progress Note          Chaplaincy Interventions Utilized:   Empowerment: Encouraged self-care and Provided chaplaincy education    Exploration: Explored spiritual needs & resources and Facilitated story telling    Collaboration: Advocated for patient/family     Relationship Building: Cultivated a relationship of care and support, Listened empathically, and Hospitality    Ritual: Celebrated with patient/family    Chaplaincy Outcomes Achieved:  Expressed gratitude    Spiritual Coping Strategies Utilized:   Spiritual practices and Spiritual gratitude     provided introduction to patient standing in room door during rounds. 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 Pt states he will be discharged home today.  celebrated with patient.  cultivated a relationship of care and support. No follow up needed as presently assessed.

## 2024-12-11 NOTE — CASE MANAGEMENT
Case Management Discharge Planning Note    Patient name Daniel Llanos  Location /-01 MRN [id number]  : 1955 Date 2024       Current Admission Date: 2024  Current Admission Diagnosis:Wheezing   Patient Active Problem List    Diagnosis Date Noted Date Diagnosed    Acute respiratory failure with hypoxia (HCC) 2024     Wheezing 2024     Tobacco abuse 2024     Primary hypertension 2024     BPH (benign prostatic hyperplasia) 2024     Acute bronchitis due to other specified organisms 2024       LOS (days): 2  Geometric Mean LOS (GMLOS) (days): 2.1  Days to GMLOS:0.1     OBJECTIVE:  Risk of Unplanned Readmission Score: 9.41         Current admission status: Inpatient   Preferred Pharmacy:   [id number] - [place], [place] - [address]  [address]  [facility] [address]  Phone: [phone] Fax: [phone]    Primary Care Provider: Sae Kline    Primary Insurance: [facility]  Secondary Insurance:     DISCHARGE DETAILS:    Discharge planning discussed with:: Pt  Freedom of Choice: Yes  Comments - Freedom of Choice: Pt is being dc home today. Pt needed a nebulizer at dc. DME ordered and given at bedside. Pt had a copayment of $11.68. SLIM notified to send all his meds to the [facility] in [facility].  CM contacted family/caregiver?: No- see comments  Were Treatment Team discharge recommendations reviewed with patient/caregiver?: Yes  Did patient/caregiver verbalize understanding of patient care needs?: Yes  Were patient/caregiver advised of the risks associated with not following Treatment Team discharge recommendations?: Yes         Requested Home Health Care         Is the patient interested in HHC at discharge?: No    DME Referral Provided  Referral made for DME?: Yes  DME referral completed for the following items:: Nebulizer  DME Supplier Name:: [employer]

## 2024-12-11 NOTE — DISCHARGE SUMMARY
Discharge Summary - Hospitalist   Name: Daniel Llanos 69 y.o. male I MRN: [id number]  Unit/Bed#: -01 I Date of Admission: 12/9/2024   Date of Service: 12/11/2024 I Hospital Day: 2     Assessment & Plan  Wheezing  Patient on presentation due to acute wheezing cough and congestion starting  Patient reports being a current smoker.  Was never diagnosed for COPD in the past.  Received IV steroids and treatment in the ED however patient was noted to have O2 drop in 80s on room air while ambulating.  Admitted for possible COPD exacerbation vs acute bronchitis.  Chest x-ray unremarkable for pneumonia.  COVID/RSV/flu normal  Plan:  Suspect secondary to emphysema/COPD exacerbation.  Patient has no official diagnosis of COPD or emphysema however chronic smoker.  Currently reports overall feeling better.  O2 saturation noted 94 to 95% on room air.  Does not need home oxygen since patient remained more than 92% on ambulation on room air.  Did well with IV Solu-Medrol.  Transition to p.o. prednisone and being discharged on tapering dose of prednisone.  Complete 3 days of azithromycin course.  Current hemodynamically stable for discharge.  Recommend outpatient follow-up with pulmonology for PFT.  Patient is in agreement with above plan.  Acute bronchitis due to other specified organisms  Plan as above.  Tobacco abuse  Counseled on smoking cessation for more than 3 minutes.    Primary hypertension  Reports taking amlodipine 10 mg daily  Will continue home regimen  BPH (benign prostatic hyperplasia)  Tamsulosin 0.4 mg  Continue home regimen     Medical Problems       Resolved Problems  Date Reviewed: 12/9/2024   None       Discharging Physician / Practitioner: Adam Ayala MD  PCP: Sae Kline  Admission Date:   Admission Orders (From admission, onward)       Ordered        12/09/24 1233  Inpatient Admission  Once                          Discharge Date: 12/11/24        Reason for Admission: Wheezing suspected secondary  to COPD/emphysema exacerbation.    Hospital Course:   Daniel Llanos is a 69 y.o. male patient active smoker, history of hypertension, BPH, no formal diagnosis of COPD/emphysema who originally presented to the hospital on 12/9/2024 due to wheezing, sore throat, shortness of breath.  X-ray pulmonary negative for acute finding.  Strep PCR negative.  Flu/COVID-negative.  Patient is active smoker and symptoms thought to be due to COPD/emphysema exacerbation however no formal diagnosis.  Was given IV Solu-Medrol and nebs treatment with significant improvement to his symptoms.  Procalcitonin negative.  Completed 3 days of azithromycin treatment.  Currently resuscitation well on room air.  Discharging on tapering dose of prednisone and nebulizer.  Recommend outpatient follow-up with PCP, pulmonology for PFT.  Patient is in agreement with above plan.  Counseled on smoking cessation.  No other events reported.  Refer to earlier notes for further clarification.    Of note: Due to epic/electronic which I was not able to electronically send prescription for tapering prednisone, albuterol inhaler, albuterol nebulizer, ipratropium nebulizer, to his [facility] pharmacy and prescription was kept being sent to [place] pharmacy which was not even selected to his home pharmacy.  I called his primary [facility] pharmacy and probably ordered all the above medications.      Please see above list of diagnoses and related plan for additional information.     Condition at Discharge: good    Discharge Day Visit / Exam:   Subjective: Seen during a.m. rounds.  Patient appears comfortable not in distress.  Reports overall feeling much better and eager to go home.  Denies chest pain, dyspnea, fever, chills, nausea, vomiting, any other new complaints.  Overall he reports feeling much better and eager to go home.      Vitals: Blood Pressure: 127/79 (12/11/24 0729)  Pulse: 79 (12/11/24 0729)  Temperature: 97.9 °F (36.6 °C) (12/10/24 2214)  Temp Source:  Oral (12/10/24 0706)  Respirations: 20 (12/11/24 0729)  Height: 6' (182.9 cm) (12/09/24 1435)  Weight - Scale: 83.9 kg (184 lb 15.5 oz) (12/09/24 1435)  SpO2: 97 % (12/11/24 0729)  Physical Exam  Constitutional:       General: He is not in acute distress.     Appearance: He is not ill-appearing, toxic-appearing or diaphoretic.   Cardiovascular:      Rate and Rhythm: Normal rate.      Pulses: Normal pulses.   Pulmonary:      Effort: Pulmonary effort is normal. No respiratory distress.      Breath sounds: Normal breath sounds. No wheezing.   Abdominal:      General: Bowel sounds are normal. There is no distension.      Palpations: Abdomen is soft.      Tenderness: There is no abdominal tenderness.   Musculoskeletal:      Right lower leg: No edema.      Left lower leg: No edema.   Neurological:      Mental Status: He is alert and oriented to person, place, and time. Mental status is at baseline.   Psychiatric:         Mood and Affect: Mood normal.         Behavior: Behavior normal.              Discharge instructions/Information to patient and family:   See after visit summary for information provided to patient and family.      Provisions for Follow-Up Care:  See after visit summary for information related to follow-up care and any pertinent home health orders.      Mobility at time of Discharge:   Basic Mobility Inpatient Raw Score: 24  JH-HLM Goal: 8: Walk 250 feet or more  JH-HLM Achieved: 8: Walk 250 feet ot more       Disposition:   Home    Planned Readmission:     Discharge Medications:  See after visit summary for reconciled discharge medications provided to patient and/or family.      Administrative Statements   Discharge Statement:  I have spent a total time of 35 minutes in caring for this patient on the day of the visit/encounter. >30 minutes of time was spent on: Instructions for management, Patient and family education, Impressions, Counseling / Coordination of care, and Documenting in the medical  record.    **Please Note: This note may have been constructed using a voice recognition system**

## 2024-12-11 NOTE — ASSESSMENT & PLAN NOTE
Patient on presentation due to acute wheezing cough and congestion starting  Patient reports being a current smoker.  Was never diagnosed for COPD in the past.  Received IV steroids and treatment in the ED however patient was noted to have O2 drop in 80s on room air while ambulating.  Admitted for possible COPD exacerbation vs acute bronchitis.  Chest x-ray unremarkable for pneumonia.  COVID/RSV/flu normal  Plan:  Suspect secondary to emphysema/COPD exacerbation.  Patient has no official diagnosis of COPD or emphysema however chronic smoker.  Currently reports overall feeling better.  O2 saturation noted 94 to 95% on room air.  Does not need home oxygen since patient remained more than 92% on ambulation on room air.  Did well with IV Solu-Medrol.  Transition to p.o. prednisone and being discharged on tapering dose of prednisone.  Complete 3 days of azithromycin course.  Current hemodynamically stable for discharge.  Recommend outpatient follow-up with pulmonology for PFT.  Patient is in agreement with above plan.

## 2024-12-11 NOTE — RESPIRATORY THERAPY NOTE
RT Protocol Note  Daniel Llanos 69 y.o. male MRN: [id number]  Unit/Bed#: -01 Encounter: [id number]    Assessment    Principal Problem:    Wheezing  Active Problems:    Tobacco abuse    Primary hypertension    BPH (benign prostatic hyperplasia)    Acute bronchitis due to other specified organisms      Home Pulmonary Medications:     12/10/24 1940   Respiratory Protocol   Protocol Initiated? No   Protocol Selection Respiratory   Language Barrier? No   Medical & Social History Reviewed? Yes   Diagnostic Studies Reviewed? Yes   Physical Assessment Performed? Yes   Respiratory Plan Mild Distress pathway   Respiratory Assessment   Assessment Type During-treatment   General Appearance Alert;Awake   Respiratory Pattern Normal   Chest Assessment Chest expansion symmetrical   Bilateral Breath Sounds Diminished   Location Specific No   Cough None   Resp Comments Will continue with current tx plan   O2 Device RA   Cough Description   Sputum Amount None   Additional Assessments   Pulse 83   Respirations 20   SpO2 91 %            History reviewed. No pertinent past medical history.  Social History     Socioeconomic History    Marital status: /Civil Union     Spouse name: None    Number of children: None    Years of education: None    Highest education level: None   Occupational History    None   Tobacco Use    Smoking status: Unknown    Smokeless tobacco: None   Vaping Use    Vaping status: Never Used   Substance and Sexual Activity    Alcohol use: Never    Drug use: Never    Sexual activity: None   Other Topics Concern    None   Social History Narrative    None     Social Drivers of Health     Financial Resource Strain: Not on file   Food Insecurity: No Food Insecurity (12/9/2024)    Nursing - Inadequate Food Risk Classification     Worried About Running Out of Food in the Last Year: Not on file     Ran Out of Food in the Last Year: Not on file     Ran Out of Food in the Last Year: 1   Transportation Needs: No  Transportation Needs (2024)    Nursing - Transportation Risk Classification     Lack of Transportation: Not on file     Lack of Transportation: 2   Physical Activity: Not on file   Stress: Not on file   Social Connections: Not on file   Intimate Partner Violence: Unknown (2024)    Nursing IPS     Feels Physically and Emotionally Safe: Not on file     Physically Hurt by Someone: Not on file     Humiliated or Emotionally Abused by Someone: Not on file     Physically Hurt by Someone: 2     Hurt or Threatened by Someone: 2   Housing Stability: Unknown (2024)    Nursing: Inadequate Housing Risk Classification     Has Housing: Not on file     Worried About Losing Housing: Not on file     Unable to Get Utilities: Not on file     Unable to Pay for Housing in the Last Year: 2     Has Housin       Subjective         Objective    Physical Exam:   Assessment Type: During-treatment  General Appearance: Alert, Awake  Respiratory Pattern: Normal  Chest Assessment: Chest expansion symmetrical  Bilateral Breath Sounds: Diminished  Location Specific: No  Cough: None  O2 Device: RA    Vitals:  Blood pressure 131/77, pulse 83, temperature 98.1 °F (36.7 °C), resp. rate 20, height 6' (1.829 m), weight 83.9 kg (184 lb 15.5 oz), SpO2 91%.          Imaging and other studies: Results Review Statement: No pertinent imaging studies reviewed.    O2 Device: RA     Plan    Respiratory Plan: Mild Distress pathway        Resp Comments: Will continue with current tx plan

## 2024-12-11 NOTE — DISCHARGE INSTR - AVS FIRST PAGE
Recommend outpatient follow-up with primary care provider in 1-2 weeks of discharge.  Recommend outpatient follow-up with pulmonology.

## 2024-12-11 NOTE — PLAN OF CARE
Problem: PAIN - ADULT  Goal: Verbalizes/displays adequate comfort level or baseline comfort level  Description: Interventions:  - Encourage patient to monitor pain and request assistance  - Assess pain using appropriate pain scale  - Administer analgesics based on type and severity of pain and evaluate response  - Implement non-pharmacological measures as appropriate and evaluate response  - Consider cultural and social influences on pain and pain management  - Notify physician/advanced practitioner if interventions unsuccessful or patient reports new pain  Outcome: Progressing     Problem: INFECTION - ADULT  Goal: Absence or prevention of progression during hospitalization  Description: INTERVENTIONS:  - Assess and monitor for signs and symptoms of infection  - Monitor lab/diagnostic results  - Monitor all insertion sites, i.e. indwelling lines, tubes, and drains  - Monitor endotracheal if appropriate and nasal secretions for changes in amount and color  - [place] appropriate cooling/warming therapies per order  - Administer medications as ordered  - Instruct and encourage patient and family to use good hand hygiene technique  - Identify and instruct in appropriate isolation precautions for identified infection/condition  Outcome: Progressing  Goal: Absence of fever/infection during neutropenic period  Description: INTERVENTIONS:  - Monitor WBC    Outcome: Progressing     Problem: SAFETY ADULT  Goal: Patient will remain free of falls  Description: INTERVENTIONS:  - Educate patient/family on patient safety including physical limitations  - Instruct patient to call for assistance with activity   - Consult OT/PT to assist with strengthening/mobility   - Keep Call bell within reach  - Keep bed low and locked with side rails adjusted as appropriate  - Keep care items and personal belongings within reach  - Initiate and maintain comfort rounds  - Make Fall Risk Sign visible to staff  - Offer Toileting every  Hours,  in advance of need  - Initiate/Maintain alarm  - Obtain necessary fall risk management equipment:   - Apply yellow socks and bracelet for high fall risk patients  - Consider moving patient to room near nurses station  Outcome: Progressing  Goal: Maintain or return to baseline ADL function  Description: INTERVENTIONS:  -  Assess patient's ability to carry out ADLs; assess patient's baseline for ADL function and identify physical deficits which impact ability to perform ADLs (bathing, care of mouth/teeth, toileting, grooming, dressing, etc.)  - Assess/evaluate cause of self-care deficits   - Assess range of motion  - Assess patient's mobility; develop plan if impaired  - Assess patient's need for assistive devices and provide as appropriate  - Encourage maximum independence but intervene and supervise when necessary  - Involve family in performance of ADLs  - Assess for home care needs following discharge   - Consider OT consult to assist with ADL evaluation and planning for discharge  - Provide patient education as appropriate  Outcome: Progressing  Goal: Maintains/Returns to pre admission functional level  Description: INTERVENTIONS:  - Perform AM-PAC 6 Click Basic Mobility/ Daily Activity assessment daily.  - Set and communicate daily mobility goal to care team and patient/family/caregiver.   - Collaborate with rehabilitation services on mobility goals if consulted  - Perform Range of Motion  times a day.  - Reposition patient every  hours.  - Dangle patient  times a day  - Stand patient  times a day  - Ambulate patient  times a day  - Out of bed to chair  times a day   - Out of bed for meals  times a day  - Out of bed for toileting  - Record patient progress and toleration of activity level   Outcome: Progressing     Problem: DISCHARGE PLANNING  Goal: Discharge to home or other facility with appropriate resources  Description: INTERVENTIONS:  - Identify barriers to discharge w/patient and caregiver  - Arrange for  needed discharge resources and transportation as appropriate  - Identify discharge learning needs (meds, wound care, etc.)  - Arrange for interpretive services to assist at discharge as needed  - Refer to Case Management Department for coordinating discharge planning if the patient needs post-hospital services based on physician/advanced practitioner order or complex needs related to functional status, cognitive ability, or social support system  Outcome: Progressing     Problem: Knowledge Deficit  Goal: Patient/family/caregiver demonstrates understanding of disease process, treatment plan, medications, and discharge instructions  Description: Complete learning assessment and assess knowledge base.  Interventions:  - Provide teaching at level of understanding  - Provide teaching via preferred learning methods  Outcome: Progressing     Problem: RESPIRATORY - ADULT  Goal: Achieves optimal ventilation and oxygenation  Description: INTERVENTIONS:  - Assess for changes in respiratory status  - Assess for changes in mentation and behavior  - Position to facilitate oxygenation and minimize respiratory effort  - Oxygen administered by appropriate delivery if ordered  - Initiate smoking cessation education as indicated  - Encourage broncho-pulmonary hygiene including cough, deep breathe, Incentive Spirometry  - Assess the need for suctioning and aspirate as needed  - Assess and instruct to report SOB or any respiratory difficulty  - Respiratory Therapy support as indicated  Outcome: Progressing

## 2024-12-11 NOTE — PLAN OF CARE
Problem: SAFETY ADULT  Goal: Patient will remain free of falls  Description: INTERVENTIONS:  - Educate patient/family on patient safety including physical limitations  - Instruct patient to call for assistance with activity   - Consult OT/PT to assist with strengthening/mobility   - Keep Call bell within reach  - Keep bed low and locked with side rails adjusted as appropriate  - Keep care items and personal belongings within reach  - Initiate and maintain comfort rounds  - Make Fall Risk Sign visible to staff  - Offer Toileting every  Hours, in advance of need  - Initiate/Maintain alarm  - Obtain necessary fall risk management equipment:   - Apply yellow socks and bracelet for high fall risk patients  - Consider moving patient to room near nurses station  Outcome: Progressing     Problem: RESPIRATORY - ADULT  Goal: Achieves optimal ventilation and oxygenation  Description: INTERVENTIONS:  - Assess for changes in respiratory status  - Assess for changes in mentation and behavior  - Position to facilitate oxygenation and minimize respiratory effort  - Oxygen administered by appropriate delivery if ordered  - Initiate smoking cessation education as indicated  - Encourage broncho-pulmonary hygiene including cough, deep breathe, Incentive Spirometry  - Assess the need for suctioning and aspirate as needed  - Assess and instruct to report SOB or any respiratory difficulty  - Respiratory Therapy support as indicated  Outcome: Progressing

## 2024-12-12 NOTE — UTILIZATION REVIEW
NOTIFICATION OF ADMISSION DISCHARGE   This is a Notification of Discharge from [facility]. Please be advised that this patient has been discharge from our facility. Below you will find the admission and discharge date and time including the patient’s disposition.   UTILIZATION REVIEW CONTACT:  Ofelia Appiah  Utilization   Network Utilization Review Department  Phone: [phone] x carefully listen to the prompts. All voicemails are confidential.  Email: [email or web address]     ADMISSION INFORMATION  PRESENTATION DATE: 12/9/2024  9:46 AM  OBERVATION ADMISSION DATE: N/A  INPATIENT ADMISSION DATE: 12/9/24 12:33 PM   DISCHARGE DATE: 12/11/2024  1:47 PM   DISPOSITION:Home/Self Care    Network Utilization Review Department  ATTENTION: Please call with any questions or concerns to [phone] and carefully listen to the prompts so that you are directed to the right person. All voicemails are confidential.   For Discharge needs, contact Care Management DC Support Team at [phone] opt. 2  Send all requests for admission clinical reviews, approved or denied determinations and any other requests to dedicated fax number below belonging to the campus where the patient is receiving treatment. List of dedicated fax numbers for the Facilities:  FACILITY NAME UR FAX NUMBER   ADMISSION DENIALS (Administrative/Medical Necessity) [phone]   DISCHARGE SUPPORT TEAM ([facility]) [phone]   PARENT CHILD HEALTH (Maternity/NICU/Pediatrics) [phone]   [facility] [phone]   [facility] [phone]   [facility] [phone]   [facility] [phone]   Critical access hospital [phone]   [facility] [phone]   [facility] [phone]   [facility]  [phone]   [facility] [phone]   [facility] [phone]   [facility] [phone]   [facility] [phone]

## 2024-12-14 LAB
BACTERIA BLD CULT: NORMAL
BACTERIA BLD CULT: NORMAL

## 2024-12-16 LAB
DME PARACHUTE DELIVERY DATE ACTUAL: NORMAL
DME PARACHUTE DELIVERY DATE REQUESTED: NORMAL
DME PARACHUTE ITEM DESCRIPTION: NORMAL
DME PARACHUTE ORDER STATUS: NORMAL
DME PARACHUTE SUPPLIER NAME: NORMAL
DME PARACHUTE SUPPLIER PHONE: NORMAL

## 2024-12-23 ENCOUNTER — TELEPHONE (OUTPATIENT)
Facility: HOSPITAL | Age: 69
End: 2024-12-23

## 2025-01-04 ENCOUNTER — HOSPITAL ENCOUNTER (EMERGENCY)
Facility: HOSPITAL | Age: 70
Discharge: HOME | End: 2025-01-04
Attending: EMERGENCY MEDICINE | Admitting: EMERGENCY MEDICINE
Payer: COMMERCIAL

## 2025-01-04 VITALS
OXYGEN SATURATION: 97 % | BODY MASS INDEX: 25.8 KG/M2 | TEMPERATURE: 98.5 F | DIASTOLIC BLOOD PRESSURE: 67 MMHG | WEIGHT: 190.26 LBS | SYSTOLIC BLOOD PRESSURE: 121 MMHG | HEART RATE: 78 BPM | RESPIRATION RATE: 16 BRPM

## 2025-01-04 DIAGNOSIS — H61.20 CERUMEN IN AUDITORY CANAL ON EXAMINATION: ICD-10-CM

## 2025-01-04 DIAGNOSIS — J01.90 ACUTE SINUSITIS: Primary | ICD-10-CM

## 2025-01-04 PROCEDURE — 99283 EMERGENCY DEPT VISIT LOW MDM: CPT

## 2025-01-04 PROCEDURE — 99284 EMERGENCY DEPT VISIT MOD MDM: CPT

## 2025-01-04 RX ORDER — PREDNISONE 20 MG/1
40 TABLET ORAL DAILY
Qty: 10 TABLET | Refills: 0 | Status: SHIPPED | OUTPATIENT
Start: 2025-01-04 | End: 2025-01-09

## 2025-01-04 NOTE — ED PROVIDER NOTES
"Time reflects when diagnosis was documented in both MDM as applicable and the Disposition within this note       Time User Action Codes Description Comment    1/4/2025  8:39 AM Kimo Gray [J01.90] Acute sinusitis     1/4/2025  8:41 AM Kimo Gray [H61.20] Cerumen in auditory canal on examination           ED Disposition       ED Disposition   Discharge    Condition   Stable    Date/Time   Sat Jan 4, 2025  8:39 AM    Comment   Daniel Jarrettkvng discharge to home/self care.                   Assessment & Plan       Medical Decision Making  69 year old male presenting today with concerns of sore throat/sinus congestion and pain. Pain and symptoms consistent with sinusitis. Suspect bacterial given acute worsening, sinus pain. Will treat with antibiotics for suspected bacterial infection. Prednisone for congestion. Debrox for cerumen in ears.  ------------------------------------------------------------  Strict return precautions discussed. Patient at time of discharge well-appearing in no acute distress, all questions answered. Patient agreeable to plan.  Patient's vitals, lab/imaging results, diagnosis, and treatment plan were discussed with the patient. All new/changed medications were discussed with patient, specifically, route of administration, how often and when to take, and where they can be picked up. Strict return precautions as well as close follow up with PCP was discussed with the patient and the patient was agreeable to my recommendations.  Patient verbally acknowledged understanding of the above communications. All labs reviewed and utilized in the medical decision making process (if labs were ordered). Portions of the record may have been created with voice recognition software.  Occasional wrong word or \"sound a like\" substitutions may have occurred due to the inherent limitations of voice recognition software.  Read the chart carefully and recognize, using context, where substitutions have " occurred.      Risk  OTC drugs.  Prescription drug management.             Medications - No data to display    ED Risk Strat Scores                          SBIRT 20yo+      Flowsheet Row Most Recent Value   Initial Alcohol Screen: US AUDIT-C     1. How often do you have a drink containing alcohol? 0 Filed at: 01/04/2025 0847   2. How many drinks containing alcohol do you have on a typical day you are drinking?  0 Filed at: 01/04/2025 0847   3a. Male UNDER 65: How often do you have five or more drinks on one occasion? 0 Filed at: 01/04/2025 0847   3b. FEMALE Any Age, or MALE 65+: How often do you have 4 or more drinks on one occassion? 0 Filed at: 01/04/2025 0847   Audit-C Score 0 Filed at: 01/04/2025 0847   JEAN: How many times in the past year have you...    Used an illegal drug or used a prescription medication for non-medical reasons? Never Filed at: 01/04/2025 0847                            History of Present Illness       Chief Complaint   Patient presents with    Sore Throat     Pt c/o sore throat x1 week, states he has a cold. Denies fevers/chills.        No past medical history on file.   No past surgical history on file.   No family history on file.   Social History     Tobacco Use    Smoking status: Unknown   Vaping Use    Vaping status: Never Used   Substance Use Topics    Alcohol use: Never    Drug use: Never      E-Cigarette/Vaping    E-Cigarette Use Never User       E-Cigarette/Vaping Substances    Nicotine No     THC No     CBD No     Flavoring No     Other No     Unknown No       I have reviewed and agree with the history as documented.     69 year old male presenting with scratchy throat, nasal congestion for the last 10 or so days. Acutely worsened a couple days ago. No blood in the sputum albeit is described as green. Mild cough, no production. No CP/SOB/N/V/D/C. No fevers. Some chills.         Review of Systems   Constitutional:  Negative for chills and fever.   HENT:  Positive for congestion,  sinus pressure, sinus pain and sore throat. Negative for ear pain and trouble swallowing.    Eyes:  Negative for pain and visual disturbance.   Respiratory:  Negative for cough, chest tightness and shortness of breath.    Cardiovascular:  Negative for chest pain and palpitations.   Gastrointestinal:  Negative for abdominal pain and vomiting.   Genitourinary:  Negative for dysuria and hematuria.   Musculoskeletal:  Negative for arthralgias and back pain.   Skin:  Negative for color change and rash.   Neurological:  Negative for seizures and syncope.   All other systems reviewed and are negative.          Objective       ED Triage Vitals [01/04/25 0829]   Temperature Pulse Blood Pressure Respirations SpO2 Patient Position - Orthostatic VS   98.5 °F (36.9 °C) 78 121/67 16 97 % Sitting      Temp Source Heart Rate Source BP Location FiO2 (%) Pain Score    Temporal Monitor Left arm -- --      Vitals      Date and Time Temp Pulse SpO2 Resp BP Pain Score FACES Pain Rating User   01/04/25 0829 98.5 °F (36.9 °C) 78 97 % 16 121/67 -- -- MS            Physical Exam  Vitals and nursing note reviewed.   Constitutional:       General: He is not in acute distress.     Appearance: He is not ill-appearing.   HENT:      Head: Normocephalic and atraumatic.      Right Ear: Tympanic membrane and ear canal normal. No drainage, swelling or tenderness. No middle ear effusion.      Left Ear: Ear canal normal. No drainage, swelling or tenderness.  No middle ear effusion. Tympanic membrane is erythematous.      Ears:      Comments: Cerumen, nonimpacted but large volume bilaterally. No dishcarge/blood/purulence     Nose: Congestion present.      Mouth/Throat:      Mouth: Mucous membranes are moist. No oral lesions.      Pharynx: Uvula midline. Posterior oropharyngeal erythema present. No pharyngeal swelling or oropharyngeal exudate.   Eyes:      General: No scleral icterus.     Conjunctiva/sclera: Conjunctivae normal.      Pupils: Pupils are  equal, round, and reactive to light.   Cardiovascular:      Rate and Rhythm: Normal rate and regular rhythm.      Pulses: Normal pulses.   Pulmonary:      Effort: Pulmonary effort is normal. No respiratory distress.   Abdominal:      Tenderness: There is no abdominal tenderness.   Musculoskeletal:         General: Normal range of motion.      Cervical back: Normal range of motion.   Skin:     General: Skin is warm and dry.      Capillary Refill: Capillary refill takes less than 2 seconds.      Coloration: Skin is not jaundiced.   Neurological:      Mental Status: He is alert and oriented to person, place, and time.         Results Reviewed       None            No orders to display       Procedures    ED Medication and Procedure Management   Prior to Admission Medications   Prescriptions Last Dose Informant Patient Reported? Taking?   albuterol (2.5 mg/3 mL) 0.083 % nebulizer solution   No No   Sig: Take 3 mL (2.5 mg total) by nebulization every 6 (six) hours as needed for wheezing or shortness of breath   albuterol (PROVENTIL HFA,VENTOLIN HFA) 90 mcg/act inhaler   No No   Sig: Inhale 2 puffs every 6 (six) hours as needed for shortness of breath or wheezing   amLODIPine (NORVASC) 5 mg tablet  Self Yes No   Sig: Take 10 mg by mouth daily   gabapentin (NEURONTIN) 300 mg capsule  Self Yes No   Sig: Take 300 mg by mouth 3 (three) times a day   ipratropium (ATROVENT) 0.02 % nebulizer solution   No No   Sig: Take 2.5 mL (0.5 mg total) by nebulization 3 (three) times a day   tamsulosin (FLOMAX) 0.4 mg  Self Yes No   Sig: Take 0.4 mg by mouth      Facility-Administered Medications: None     Discharge Medication List as of 1/4/2025  8:41 AM        START taking these medications    Details   amoxicillin-clavulanate (AUGMENTIN) 875-125 mg per tablet Take 1 tablet by mouth every 12 (twelve) hours for 7 days, Starting Sat 1/4/2025, Until Sat 1/11/2025, Normal      carbamide peroxide (DEBROX) 6.5 % otic solution Administer 5  drops into ears 2 (two) times a day, Starting Sat 1/4/2025, Normal      predniSONE 20 mg tablet Take 2 tablets (40 mg total) by mouth daily for 5 days, Starting Sat 1/4/2025, Until Thu 1/9/2025, Normal           CONTINUE these medications which have NOT CHANGED    Details   albuterol (2.5 mg/3 mL) 0.083 % nebulizer solution Take 3 mL (2.5 mg total) by nebulization every 6 (six) hours as needed for wheezing or shortness of breath, Starting Wed 12/11/2024, Normal      albuterol (PROVENTIL HFA,VENTOLIN HFA) 90 mcg/act inhaler Inhale 2 puffs every 6 (six) hours as needed for shortness of breath or wheezing, Starting Wed 12/11/2024, Normal      amLODIPine (NORVASC) 5 mg tablet Take 10 mg by mouth daily, Starting Mon 4/24/2023, Until Mon 12/9/2024, Historical Med      gabapentin (NEURONTIN) 300 mg capsule Take 300 mg by mouth 3 (three) times a day, Historical Med      ipratropium (ATROVENT) 0.02 % nebulizer solution Take 2.5 mL (0.5 mg total) by nebulization 3 (three) times a day, Starting Wed 12/11/2024, Normal      tamsulosin (FLOMAX) 0.4 mg Take 0.4 mg by mouth, Starting Fri 3/15/2024, Until Sat 3/15/2025 at 2359, Historical Med           No discharge procedures on file.  ED SEPSIS DOCUMENTATION   Time reflects when diagnosis was documented in both MDM as applicable and the Disposition within this note       Time User Action Codes Description Comment    1/4/2025  8:39 AM Kimo Gray [J01.90] Acute sinusitis     1/4/2025  8:41 AM Kimo Gray [H61.20] Cerumen in auditory canal on examination                  Kimo Gray PA-C  01/04/25 2583

## 2025-01-08 PROBLEM — J20.8 ACUTE BRONCHITIS DUE TO OTHER SPECIFIED ORGANISMS: Status: RESOLVED | Noted: 2024-12-09 | Resolved: 2025-01-08
